# Patient Record
Sex: MALE | Race: WHITE | NOT HISPANIC OR LATINO | Employment: UNEMPLOYED | URBAN - METROPOLITAN AREA
[De-identification: names, ages, dates, MRNs, and addresses within clinical notes are randomized per-mention and may not be internally consistent; named-entity substitution may affect disease eponyms.]

---

## 2017-03-07 ENCOUNTER — HOSPITAL ENCOUNTER (EMERGENCY)
Facility: HOSPITAL | Age: 37
Discharge: OTHER INSTITUTION | End: 2017-03-07
Attending: EMERGENCY MEDICINE | Admitting: EMERGENCY MEDICINE

## 2017-03-07 VITALS
HEIGHT: 69 IN | TEMPERATURE: 99 F | BODY MASS INDEX: 26.51 KG/M2 | SYSTOLIC BLOOD PRESSURE: 139 MMHG | HEART RATE: 110 BPM | OXYGEN SATURATION: 96 % | WEIGHT: 179 LBS | RESPIRATION RATE: 16 BRPM | DIASTOLIC BLOOD PRESSURE: 88 MMHG

## 2017-03-07 DIAGNOSIS — F33.2 SEVERE EPISODE OF RECURRENT MAJOR DEPRESSIVE DISORDER, WITHOUT PSYCHOTIC FEATURES (HCC): ICD-10-CM

## 2017-03-07 DIAGNOSIS — T14.91XA SUICIDE ATTEMPT (HCC): Primary | ICD-10-CM

## 2017-03-07 LAB
ALBUMIN SERPL-MCNC: 4.8 G/DL (ref 3.5–5.2)
ALBUMIN/GLOB SERPL: 1.7 G/DL
ALP SERPL-CCNC: 78 U/L (ref 39–117)
ALT SERPL W P-5'-P-CCNC: 62 U/L (ref 1–41)
AMPHET+METHAMPHET UR QL: NEGATIVE
ANION GAP SERPL CALCULATED.3IONS-SCNC: 16.9 MMOL/L
APAP SERPL-MCNC: <5 MCG/ML (ref 10–30)
AST SERPL-CCNC: 27 U/L (ref 1–40)
BARBITURATES UR QL SCN: POSITIVE
BASOPHILS # BLD AUTO: 0.02 10*3/MM3 (ref 0–0.2)
BASOPHILS NFR BLD AUTO: 0.2 % (ref 0–1.5)
BENZODIAZ UR QL SCN: NEGATIVE
BILIRUB SERPL-MCNC: 0.3 MG/DL (ref 0.1–1.2)
BUN BLD-MCNC: 10 MG/DL (ref 6–20)
BUN/CREAT SERPL: 11.9 (ref 7–25)
CALCIUM SPEC-SCNC: 10 MG/DL (ref 8.6–10.5)
CANNABINOIDS SERPL QL: NEGATIVE
CHLORIDE SERPL-SCNC: 98 MMOL/L (ref 98–107)
CO2 SERPL-SCNC: 24.1 MMOL/L (ref 22–29)
COCAINE UR QL: NEGATIVE
CREAT BLD-MCNC: 0.84 MG/DL (ref 0.76–1.27)
DEPRECATED RDW RBC AUTO: 41.7 FL (ref 37–54)
EOSINOPHIL # BLD AUTO: 0.01 10*3/MM3 (ref 0–0.7)
EOSINOPHIL NFR BLD AUTO: 0.1 % (ref 0.3–6.2)
ERYTHROCYTE [DISTWIDTH] IN BLOOD BY AUTOMATED COUNT: 12.3 % (ref 11.5–14.5)
ETHANOL BLD-MCNC: <10 MG/DL (ref 0–10)
ETHANOL UR QL: <0.01 %
GFR SERPL CREATININE-BSD FRML MDRD: 103 ML/MIN/1.73
GLOBULIN UR ELPH-MCNC: 2.8 GM/DL
GLUCOSE BLD-MCNC: 124 MG/DL (ref 65–99)
HCT VFR BLD AUTO: 44.3 % (ref 40.4–52.2)
HGB BLD-MCNC: 15.6 G/DL (ref 13.7–17.6)
HOLD SPECIMEN: NORMAL
HOLD SPECIMEN: NORMAL
IMM GRANULOCYTES # BLD: 0.2 10*3/MM3 (ref 0–0.03)
IMM GRANULOCYTES NFR BLD: 1.6 % (ref 0–0.5)
LYMPHOCYTES # BLD AUTO: 1.27 10*3/MM3 (ref 0.9–4.8)
LYMPHOCYTES NFR BLD AUTO: 10.4 % (ref 19.6–45.3)
MCH RBC QN AUTO: 32.7 PG (ref 27–32.7)
MCHC RBC AUTO-ENTMCNC: 35.2 G/DL (ref 32.6–36.4)
MCV RBC AUTO: 92.9 FL (ref 79.8–96.2)
METHADONE UR QL SCN: NEGATIVE
MONOCYTES # BLD AUTO: 0.92 10*3/MM3 (ref 0.2–1.2)
MONOCYTES NFR BLD AUTO: 7.6 % (ref 5–12)
NEUTROPHILS # BLD AUTO: 9.75 10*3/MM3 (ref 1.9–8.1)
NEUTROPHILS NFR BLD AUTO: 80.1 % (ref 42.7–76)
OPIATES UR QL: NEGATIVE
OXYCODONE UR QL SCN: NEGATIVE
PLATELET # BLD AUTO: 169 10*3/MM3 (ref 140–500)
PMV BLD AUTO: 9.6 FL (ref 6–12)
POTASSIUM BLD-SCNC: 4.4 MMOL/L (ref 3.5–5.2)
PROT SERPL-MCNC: 7.6 G/DL (ref 6–8.5)
RBC # BLD AUTO: 4.77 10*6/MM3 (ref 4.6–6)
SALICYLATES SERPL-MCNC: <0.3 MG/DL
SODIUM BLD-SCNC: 139 MMOL/L (ref 136–145)
WBC NRBC COR # BLD: 12.17 10*3/MM3 (ref 4.5–10.7)
WHOLE BLOOD HOLD SPECIMEN: NORMAL
WHOLE BLOOD HOLD SPECIMEN: NORMAL

## 2017-03-07 PROCEDURE — 80307 DRUG TEST PRSMV CHEM ANLYZR: CPT | Performed by: PHYSICIAN ASSISTANT

## 2017-03-07 PROCEDURE — 99285 EMERGENCY DEPT VISIT HI MDM: CPT

## 2017-03-07 PROCEDURE — 90791 PSYCH DIAGNOSTIC EVALUATION: CPT

## 2017-03-07 PROCEDURE — 85025 COMPLETE CBC W/AUTO DIFF WBC: CPT | Performed by: PHYSICIAN ASSISTANT

## 2017-03-07 PROCEDURE — 80053 COMPREHEN METABOLIC PANEL: CPT | Performed by: PHYSICIAN ASSISTANT

## 2017-03-07 RX ORDER — CITALOPRAM 10 MG/1
10 TABLET ORAL DAILY
COMMUNITY
End: 2020-01-26 | Stop reason: HOSPADM

## 2017-03-07 RX ORDER — SODIUM CHLORIDE 0.9 % (FLUSH) 0.9 %
10 SYRINGE (ML) INJECTION AS NEEDED
Status: DISCONTINUED | OUTPATIENT
Start: 2017-03-07 | End: 2017-03-07 | Stop reason: HOSPADM

## 2017-03-07 NOTE — ED NOTES
"Pt. states, \"I keep trying to kill myself and it just won't work\".     Fabrizio De La Cruz RN  03/07/17 2935    "

## 2017-03-07 NOTE — ED NOTES
"Pt. states,\"  I feel like my skin is crawling and I can't stop feeling like I want to kill myself.\"     Fabrizio De La Cruz RN  03/07/17 8959    "

## 2017-03-07 NOTE — ED NOTES
Pt. attempted to leave premise I redirected patient and educated pt.about care after discharge from [facility].  Pt. agreed to stay until transfer to [facility].     Fabrizio De La Cruz RN  03/07/17 9075
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
## 2019-01-17 ENCOUNTER — TRANSCRIBE ORDERS (OUTPATIENT)
Dept: ADMINISTRATIVE | Facility: HOSPITAL | Age: 39
End: 2019-01-17

## 2019-01-17 ENCOUNTER — LAB (OUTPATIENT)
Dept: LAB | Facility: HOSPITAL | Age: 39
End: 2019-01-17
Attending: SPECIALIST

## 2019-01-17 ENCOUNTER — HOSPITAL ENCOUNTER (OUTPATIENT)
Dept: CARDIOLOGY | Facility: HOSPITAL | Age: 39
Discharge: HOME | End: 2019-01-17
Attending: SPECIALIST | Admitting: SPECIALIST

## 2019-01-17 DIAGNOSIS — Z01.818 PRE-OP EXAMINATION: ICD-10-CM

## 2019-01-17 DIAGNOSIS — Z01.818 PRE-OP EXAMINATION: Primary | ICD-10-CM

## 2019-01-17 LAB
ANION GAP SERPL CALCULATED.3IONS-SCNC: 13.7 MMOL/L
BASOPHILS # BLD AUTO: 0.04 10*3/MM3 (ref 0–0.2)
BASOPHILS NFR BLD AUTO: 0.5 % (ref 0–2)
BUN BLD-MCNC: 13 MG/DL (ref 6–20)
BUN/CREAT SERPL: 15.9 (ref 7–25)
CALCIUM SPEC-SCNC: 9.2 MG/DL (ref 8.6–10.5)
CHLORIDE SERPL-SCNC: 102 MMOL/L (ref 98–107)
CO2 SERPL-SCNC: 26.3 MMOL/L (ref 22–29)
CREAT BLD-MCNC: 0.82 MG/DL (ref 0.76–1.27)
DEPRECATED RDW RBC AUTO: 44.8 FL (ref 37–54)
EOSINOPHIL # BLD AUTO: 0.08 10*3/MM3 (ref 0.1–0.3)
EOSINOPHIL NFR BLD AUTO: 1.1 % (ref 0–4)
ERYTHROCYTE [DISTWIDTH] IN BLOOD BY AUTOMATED COUNT: 12.6 % (ref 11.5–14.5)
GFR SERPL CREATININE-BSD FRML MDRD: 105 ML/MIN/1.73
GLUCOSE BLD-MCNC: 94 MG/DL (ref 65–99)
HCT VFR BLD AUTO: 49.1 % (ref 42–52)
HGB BLD-MCNC: 16.3 G/DL (ref 14–18)
IMM GRANULOCYTES # BLD AUTO: 0.24 10*3/MM3 (ref 0–0.03)
IMM GRANULOCYTES NFR BLD AUTO: 3.2 % (ref 0–0.5)
LYMPHOCYTES # BLD AUTO: 1.48 10*3/MM3 (ref 0.6–4.8)
LYMPHOCYTES NFR BLD AUTO: 19.9 % (ref 20–45)
MCH RBC QN AUTO: 32.1 PG (ref 27–31)
MCHC RBC AUTO-ENTMCNC: 33.2 G/DL (ref 31–37)
MCV RBC AUTO: 96.8 FL (ref 80–94)
MONOCYTES # BLD AUTO: 0.7 10*3/MM3 (ref 0–1)
MONOCYTES NFR BLD AUTO: 9.4 % (ref 3–8)
NEUTROPHILS # BLD AUTO: 4.91 10*3/MM3 (ref 1.5–8.3)
NEUTROPHILS NFR BLD AUTO: 65.9 % (ref 45–70)
NRBC BLD AUTO-RTO: 0 /100 WBC (ref 0–0)
PLATELET # BLD AUTO: 194 10*3/MM3 (ref 140–500)
PMV BLD AUTO: 9 FL (ref 7.4–10.4)
POTASSIUM BLD-SCNC: 4.1 MMOL/L (ref 3.5–5.2)
RBC # BLD AUTO: 5.07 10*6/MM3 (ref 4.7–6.1)
SODIUM BLD-SCNC: 142 MMOL/L (ref 136–145)
WBC NRBC COR # BLD: 7.45 10*3/MM3 (ref 4.8–10.8)

## 2019-01-17 PROCEDURE — 93005 ELECTROCARDIOGRAM TRACING: CPT | Performed by: SPECIALIST

## 2019-01-17 PROCEDURE — 36415 COLL VENOUS BLD VENIPUNCTURE: CPT

## 2019-01-17 PROCEDURE — 93010 ELECTROCARDIOGRAM REPORT: CPT | Performed by: INTERNAL MEDICINE

## 2019-01-17 PROCEDURE — 80048 BASIC METABOLIC PNL TOTAL CA: CPT

## 2019-01-17 PROCEDURE — 85025 COMPLETE CBC W/AUTO DIFF WBC: CPT

## 2019-12-04 ENCOUNTER — HOSPITAL ENCOUNTER (OUTPATIENT)
Dept: PHYSICAL THERAPY | Facility: HOSPITAL | Age: 39
Setting detail: THERAPIES SERIES
Discharge: HOME | End: 2019-12-04

## 2019-12-04 DIAGNOSIS — Z98.890 STATUS POST LABRAL REPAIR OF SHOULDER: Primary | ICD-10-CM

## 2019-12-04 PROCEDURE — 97161 PT EVAL LOW COMPLEX 20 MIN: CPT

## 2019-12-04 NOTE — THERAPY EVALUATION
Outpatient Physical Therapy Ortho Initial Evaluation   Krysta Borjas     Patient Name: Abelino Austin  : 1980  MRN: [id number]  Today's Date: 2019      Visit Date: 2019    There is no problem list on file for this patient.       Past Medical History:   Diagnosis Date   • Anxiety    • Depression    • Substance abuse    • Suicidal intent    • Suicide attempt         No past surgical history on file.    Visit Dx:     ICD-10-CM ICD-9-CM   1. Status post labral repair of shoulder Z98.890 V45.89         Patient History     Row Name 19 1100             History    Chief Complaint  Difficulty with daily activities;Joint stiffness;Muscle tenderness;Muscle weakness;Pain  -AS      Type of Pain  Shoulder pain Left  -AS      Date Current Problem(s) Began  19  -AS      Brief Description of Current Complaint  Patient states he initially tore his labral and had it repaired in 2019. Patient states he went through PT and did well. He states he reinjured in shoulder when he fell at work. He recently had another surgical repair on 19. He states he wore a sling until this past Monday. He states he has been performing pendulum exercises. 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  -AS      Patient/Caregiver Goals  Relieve pain;Return to prior level of function;Return to work;Improve mobility;Improve strength  -AS      Patient's Rating of General Health  Good  -AS      Hand Dominance  left-handed  -AS      Occupation/sports/leisure activities  [employer]  -AS      Patient seeing anyone else for problem(s)?  Dr. Martínez  -AS      How has patient tried to help current problem?  rest, ice, sling  -AS      What clinical tests have you had for this problem?  MRI  -AS      Results of Clinical Tests  torn labram, L shoulder  -AS      Surgery/Hospitalization  19  -AS         Pain     Pain Location  Shoulder  -AS      Pain at Present  4   -AS      Pain at Best  0  -AS      Pain at Worst  8  -AS      Pain Frequency  Intermittent  -AS      Pain Description  Aching  -AS      What Performance Factors Make the Current Problem(s) WORSE?  use of LUE  -AS      What Performance Factors Make the Current Problem(s) BETTER?  rest  -AS         Daily Activities    Primary Language  English  -AS      How does patient learn best?  Listening;Reading  -AS      Teaching needs identified  Home Exercise Program;Management of Condition  -AS      Patient is concerned about/has problems with  Difficulty with self care (i.e. bathing, dressing, toileting:;Flexibility;Performing home management (household chores, shopping, care of dependents);Performing job responsibilities/community activities (work, school,;Performing sports, recreation, and play activities;Reaching over head;Repetitive movements of the hand, arm, shoulder  -AS      Does patient have problems with the following?  None  -AS      Barriers to learning  None  -AS      Pt Participated in POC and Goals  Yes  -AS         Safety    Are you being hurt, hit, or frightened by anyone at home or in your life?  No  -AS      Are you being neglected by a caregiver  No  -AS        User Key  (r) = Recorded By, (t) = Taken By, (c) = Cosigned By    Initials Name Provider Type    AS Alcides Carvalho, PT Physical Therapist          PT Ortho     Row Name 12/04/19 1100       Precautions and Contraindications    Precautions/Limitations  shoulder precautions Post-op labral repair 11-18-19  -AS       Subjective Pain    Able to rate subjective pain?  yes  -AS    Pre-Treatment Pain Level  4  -AS    Post-Treatment Pain Level  3  -AS       Posture/Observations    Posture- WNL  Posture is WNL  -AS    Observations  Incision healing  -AS       Shoulder Girdle Palpation    Subacromial Space  Left:;Tender  -AS    Long Head of Biceps  Left:;Tender  -AS    Greater Tubercule  Left:;Tender  -AS       Left Upper Ext    Lt Shoulder Abduction  PROM  124  -AS    Lt Shoulder Flexion PROM  125  -AS    Lt Shoulder External Rotation PROM  20  -AS    Lt Shoulder Internal Rotation PROM  70  -AS       MMT Left Upper Ext    Lt Shoulder Flexion MMT, Gross Movement  -- Not performed at IE due to recent surgery  -AS    Lt Shoulder ABduction MMT, Gross Movement  -- Not performed at IE due to recent surgery  -AS    Lt Shoulder Internal Rotation MMT, Gross Movement  -- Not performed at IE due to recent surgery  -AS    Lt Shoulder External Rotation MMT, Gross Movement  -- Not performed at IE due to recent surgery  -AS       Sensation    Sensation WNL?  WNL  -AS    Light Touch  No apparent deficits  -AS      User Key  (r) = Recorded By, (t) = Taken By, (c) = Cosigned By    Initials Name Provider Type    AS Alcides Carvalho, PT Physical Therapist                      Therapy Education  Given: HEP, Symptoms/condition management, Pain management  Program: New  How Provided: Verbal, Demonstration, Written  Provided to: Patient  Level of Understanding: Teach back education performed, Demonstrated, Verbalized     PT OP Goals     Row Name 12/04/19 1100          PT Short Term Goals    STG Date to Achieve  12/25/19  -AS     STG 1  Patient to demonstrate compliance with initial HEP for flexibility, ROM, and strengthening.  -AS     STG 2  Patient to report left shoulder pain on VAS of 4-5/10 at worst with activity.   -AS     STG 3  Patient to demonstrate improved left shoulder PROM to WNL in all planes.  -AS     STG 4  Patient to demonstrate improved left shoulder strength to 4-/5 in all planes.  -AS        Long Term Goals    LTG Date to Achieve  01/15/20  -AS     LTG 1  Patient to demonstrate compliance with advanced HEP for flexibility, ROM, and strengthening.  -AS     LTG 2  Patient to report left shoulder pain on VAS of 0-1/10 at worst with activity.   -AS     LTG 3  Patient to demonstrate improved left shoulder AROM to WNL in all planes.  -AS     LTG 4  Patient to  demonstrate improved left shoulder strength to 4/5 in all planes.  -AS     LTG 5  Patient to report improved function and decreased left shoulder pain on Quick DASH by >20-30 points.  -AS        Time Calculation    PT Goal Re-Cert Due Date  01/01/20  -AS       User Key  (r) = Recorded By, (t) = Taken By, (c) = Cosigned By    Initials Name Provider Type    AS Alcides Carvalho, PT Physical Therapist          PT Assessment/Plan     Row Name 12/04/19 1100          PT Assessment    Functional Limitations  Limitation in home management;Limitations in community activities;Performance in leisure activities;Performance in self-care ADL;Performance in sport activities;Performance in work activities  -AS     Impairments  Impaired flexibility;Joint mobility;Muscle strength;Pain;Range of motion  -AS     Assessment Comments  Patient is s/p left shoulder labral repair on 11-18-19. Patient recently D/C from sling and is now referred to outpatient PT. Patient has limited left shoulder ROM, limited left shoulder strength, and increased left shoulder pain and discomfort with activity. Patient is currently off work due to recent surgery. Patient has limited function at this time secondary to the above.  -AS     Please refer to paper survey for additional self-reported information  Yes  -AS     Rehab Potential  Good  -AS     Patient/caregiver participated in establishment of treatment plan and goals  Yes  -AS     Patient would benefit from skilled therapy intervention  Yes  -AS        PT Plan    PT Frequency  3x/week  -AS     Predicted Duration of Therapy Intervention (Therapy Eval)  5-6 weeks  -AS     Planned CPT's?  PT RE-EVAL: 43641;PT THER PROC EA 15 MIN: 21791;PT THER ACT EA 15 MIN: 45841;PT MANUAL THERAPY EA 15 MIN: 66625;PT NEUROMUSC RE-EDUCATION EA 15 MIN: 14859;PT ELECTRICAL STIM UNATTEND: ;PT ULTRASOUND EA 15 MIN: 30405;PT HOT/COLD PACK WC NONMCARE: 19673  -AS       User Key  (r) = Recorded By, (t) = Taken By, (c) =  Cosigned By    Initials Name Provider Type    AS Alcides Carvalho, PT Physical Therapist          Modalities     Row Name 12/04/19 1100             Ice    Ice Applied  Yes  -AS      Location  Left Shoulder  -AS      Rx Minutes  10 mins  -AS      Ice S/P Rx  Yes  -AS        User Key  (r) = Recorded By, (t) = Taken By, (c) = Cosigned By    Initials Name Provider Type    AS Alcides Carvalho, PT Physical Therapist        OP Exercises     Row Name 12/04/19 1100             Subjective Pain    Able to rate subjective pain?  yes  -AS      Pre-Treatment Pain Level  4  -AS      Post-Treatment Pain Level  3  -AS        User Key  (r) = Recorded By, (t) = Taken By, (c) = Cosigned By    Initials Name Provider Type    AS Alcides Carvalho, PT Physical Therapist           Manual Rx (last 36 hours)      Manual Treatments     Row Name 12/04/19 1100             Manual Rx 1    Manual Rx 1 Location  Left Shoulder  -AS      Manual Rx 1 Type  PROM - Flex, ABD, IR, ER  -AS      Manual Rx 1 Duration  15 min  -AS        User Key  (r) = Recorded By, (t) = Taken By, (c) = Cosigned By    Initials Name Provider Type    AS Alcides Carvalho, PT Physical Therapist                      Outcome Measure Options: Quick DASH  Quick DASH  Open a tight or new jar.: Severe Difficulty  Do heavy household chores (e.g., wash walls, wash floors): Unable  Carry a shopping bag or briefcase: Moderate Difficulty  Wash your back: Severe Difficulty  Use a knife to cut food: Moderate Difficulty  Recreational activities in which you take some force or impact through your arm, should or hand (e.g. golf, hammering, tennis, etc.): Unable  During the past week, to what extent has your arm, shoulder, or hand problem interfered with your normal social activites with family, friends, neighbors or groups?: Quite a bit  During the past week, were you limited in your work or other regular daily activities as a result of your arm, shoulder or hand problem?:  Unable  Arm, Shoulder, or hand pain: Severe  Tingling (pins and needles) in your arm, shoulder, or hand: None  During the past week, how much difficulty have you had sleeping because of the pain in your arm, shoulder or hand?: Severe Difficulty  Number of Questions Answered: 11  Quick DASH Score: 70.45  Work Module (Optional)  Using your usual technique for your work?: Unable  Doing your usual work because of arm, shoulder or hand pain?: Unable  Doing your work as well as you would like?: Unable  Spending your usual amount of time doing your work?: Unable  Work Module Score: 100  Sports/Performing Arts Module (Optional)  Using your usual technique for playing your instrument or sport?: Unable  Playing your musical instrument or sport because of arm, shoulder or hand pain?: Unable  Playing your musical instrument or sport as well as you would like?: Unable  Spending your usual amount of time practising or playing your instrument or sport?: Unable  Sports/Performing Arts Score: 100         Time Calculation:     Start Time: 1040  Stop Time: 1121  Time Calculation (min): 41 min     Therapy Charges for Today     Code Description Service Date Service Provider Modifiers Qty    [id number] HC PT EVAL LOW COMPLEXITY 2 12/4/2019 Alcides Carvalho, PT GP 1          PT G-Codes  Outcome Measure Options: Quick DASH  Quick DASH Score: 70.45         Alcides Carvalho, PT  12/4/2019

## 2019-12-06 ENCOUNTER — HOSPITAL ENCOUNTER (OUTPATIENT)
Dept: PHYSICAL THERAPY | Facility: HOSPITAL | Age: 39
Setting detail: THERAPIES SERIES
Discharge: HOME | End: 2019-12-06

## 2019-12-06 DIAGNOSIS — Z98.890 STATUS POST LABRAL REPAIR OF SHOULDER: Primary | ICD-10-CM

## 2019-12-06 PROCEDURE — 97140 MANUAL THERAPY 1/> REGIONS: CPT

## 2019-12-06 NOTE — THERAPY TREATMENT NOTE
Outpatient Physical Therapy Ortho Treatment Note  [facility]     Patient Name: Abelino Austin  : 1980  MRN: [id number]  Today's Date: 2019      Visit Date: 2019    Visit Dx:    ICD-10-CM ICD-9-CM   1. Status post labral repair of shoulder Z98.890 V45.89       There is no problem list on file for this patient.       Past Medical History:   Diagnosis Date   • Anxiety    • Depression    • Substance abuse    • Suicidal intent    • Suicide attempt         No past surgical history on file.    PT Ortho     Row Name 19 1100       Precautions and Contraindications    Precautions/Limitations  shoulder precautions Post-op labral repair 19  -AS       Subjective Pain    Able to rate subjective pain?  yes  -AS    Pre-Treatment Pain Level  4  -AS    Post-Treatment Pain Level  3  -AS       Posture/Observations    Posture- WNL  Posture is WNL  -AS    Observations  Incision healing  -AS       Shoulder Girdle Palpation    Subacromial Space  Left:;Tender  -AS    Long Head of Biceps  Left:;Tender  -AS    Greater Tubercule  Left:;Tender  -AS       Left Upper Ext    Lt Shoulder Abduction PROM  124  -AS    Lt Shoulder Flexion PROM  125  -AS    Lt Shoulder External Rotation PROM  20  -AS    Lt Shoulder Internal Rotation PROM  70  -AS       MMT Left Upper Ext    Lt Shoulder Flexion MMT, Gross Movement  -- Not performed at IE due to recent surgery  -AS    Lt Shoulder ABduction MMT, Gross Movement  -- Not performed at IE due to recent surgery  -AS    Lt Shoulder Internal Rotation MMT, Gross Movement  -- Not performed at IE due to recent surgery  -AS    Lt Shoulder External Rotation MMT, Gross Movement  -- Not performed at IE due to recent surgery  -AS       Sensation    Sensation WNL?  WNL  -AS    Light Touch  No apparent deficits  -AS      User Key  (r) = Recorded By, (t) = Taken By, (c) = Cosigned By    Initials Name Provider Type    AS Alcides Carvalho, PT Physical Therapist                       PT Assessment/Plan     Row Name 12/06/19 1100          PT Assessment    Assessment Comments  Continued with PROM of his left shoulder today as well as initiated pulleys today in flexion and abduction. Patient tolerated his treatment well today.  -AS        PT Plan    PT Plan Comments  Continue with current treatment plan.  -AS       User Key  (r) = Recorded By, (t) = Taken By, (c) = Cosigned By    Initials Name Provider Type    AS Alcides Carvalho, PT Physical Therapist          Modalities     Row Name 12/06/19 1100             Ice    Ice Applied  Yes  -AS      Location  Left Shoulder  -AS      Rx Minutes  10 mins  -AS      Ice S/P Rx  Yes  -AS        User Key  (r) = Recorded By, (t) = Taken By, (c) = Cosigned By    Initials Name Provider Type    AS Alcides Carvalho, PT Physical Therapist        OP Exercises     Row Name 12/06/19 1100             Subjective Comments    Subjective Comments  Patient states his shoulder is sore but he is doing ok this morning.  -AS         Exercise 1    Exercise Name 1  Pulleys - Flex & ABD  -AS      Time 1  3 min each  -AS        User Key  (r) = Recorded By, (t) = Taken By, (c) = Cosigned By    Initials Name Provider Type    AS Alcides Carvalho, PT Physical Therapist                      Manual Rx (last 36 hours)      Manual Treatments     Row Name 12/06/19 1100             Manual Rx 1    Manual Rx 1 Location  Left Shoulder  -AS      Manual Rx 1 Type  PROM - Flex, ABD, IR, ER  -AS      Manual Rx 1 Duration  15 min  -AS        User Key  (r) = Recorded By, (t) = Taken By, (c) = Cosigned By    Initials Name Provider Type    AS Alcides Carvalho, PT Physical Therapist                             Time Calculation:   Start Time: 1050  Stop Time: 1125  Time Calculation (min): 35 min  Therapy Charges for Today     Code Description Service Date Service Provider Modifiers Qty    55472775510 HC PT MANUAL THERAPY EA 15 MIN 12/6/2019 Alcides Carvalho, PT  GP 1                    Alcides Carvalho, PT  12/6/2019

## 2019-12-09 ENCOUNTER — HOSPITAL ENCOUNTER (OUTPATIENT)
Dept: PHYSICAL THERAPY | Facility: HOSPITAL | Age: 39
Setting detail: THERAPIES SERIES
Discharge: HOME | End: 2019-12-09

## 2019-12-09 DIAGNOSIS — Z98.890 STATUS POST LABRAL REPAIR OF SHOULDER: Primary | ICD-10-CM

## 2019-12-09 PROCEDURE — 97140 MANUAL THERAPY 1/> REGIONS: CPT

## 2019-12-09 NOTE — THERAPY TREATMENT NOTE
Outpatient Physical Therapy Ortho Treatment Note   Krysta Borjas     Patient Name: Abelino Austin  : 1980  MRN: [id number]  Today's Date: 2019      Visit Date: 2019    Visit Dx:    ICD-10-CM ICD-9-CM   1. Status post labral repair of shoulder Z98.890 V45.89       There is no problem list on file for this patient.       Past Medical History:   Diagnosis Date   • Anxiety    • Depression    • Substance abuse    • Suicidal intent    • Suicide attempt         No past surgical history on file.                    PT Assessment/Plan     Row Name 19 1100          PT Assessment    Assessment Comments  Patient's left shoulder ROM is improving. He tolerated his treatment well today. Patient continues to complain of left shoulder pain and discomfort.  -AS        PT Plan    PT Plan Comments  Continue with current treatment plan.  -AS       User Key  (r) = Recorded By, (t) = Taken By, (c) = Cosigned By    Initials Name Provider Type    AS Alcides Carvalho, PT Physical Therapist          Modalities     Row Name 19 1100             Moist Heat    MH Applied  Yes  -AS      Location  Left Shoulder  -AS      Rx Minutes  10 mins  -AS      MH Prior to Rx  Yes  -AS         Ice    Ice Applied  Yes  -AS      Location  Left Shoulder  -AS      Rx Minutes  10 mins  -AS      Ice S/P Rx  Yes  -AS        User Key  (r) = Recorded By, (t) = Taken By, (c) = Cosigned By    Initials Name Provider Type    AS Alcides Carvalho, PT Physical Therapist        OP Exercises     Row Name 19 1100             Subjective Comments    Subjective Comments  Patient states his shoulder feels sore today.  -AS         Exercise 1    Exercise Name 1  Pulleys - Flex & ABD  -AS      Time 1  4 min each  -AS        User Key  (r) = Recorded By, (t) = Taken By, (c) = Cosigned By    Initials Name Provider Type    AS Alcides Carvahlo, PT Physical Therapist                      Manual Rx (last 36 hours)      Manual  Treatments     Row Name 12/09/19 1100             Manual Rx 1    Manual Rx 1 Location  Left Shoulder  -AS      Manual Rx 1 Type  PROM - Flex, ABD, IR, ER  -AS      Manual Rx 1 Duration  15 min  -AS        User Key  (r) = Recorded By, (t) = Taken By, (c) = Cosigned By    Initials Name Provider Type    AS Alcides Carvalho, PT Physical Therapist                             Time Calculation:   Start Time: 1054  Stop Time: 1132  Time Calculation (min): 38 min  Therapy Charges for Today     Code Description Service Date Service Provider Modifiers Qty    50607920531  PT MANUAL THERAPY EA 15 MIN 12/9/2019 Alcides Carvalho, PT GP 1                    Alcides Carvalho, PT  12/9/2019

## 2019-12-13 ENCOUNTER — HOSPITAL ENCOUNTER (OUTPATIENT)
Dept: PHYSICAL THERAPY | Facility: HOSPITAL | Age: 39
Setting detail: THERAPIES SERIES
Discharge: HOME | End: 2019-12-13

## 2019-12-13 DIAGNOSIS — Z98.890 STATUS POST LABRAL REPAIR OF SHOULDER: Primary | ICD-10-CM

## 2019-12-13 PROCEDURE — 97140 MANUAL THERAPY 1/> REGIONS: CPT

## 2019-12-13 NOTE — THERAPY TREATMENT NOTE
Outpatient Physical Therapy Ortho Treatment Note   Krysta Borjas     Patient Name: Abelino Austin  : 1980  MRN: [id number]  Today's Date: 2019      Visit Date: 2019    Visit Dx:    ICD-10-CM ICD-9-CM   1. Status post labral repair of shoulder Z98.890 V45.89       There is no problem list on file for this patient.       Past Medical History:   Diagnosis Date   • Anxiety    • Depression    • Substance abuse    • Suicidal intent    • Suicide attempt         No past surgical history on file.                    PT Assessment/Plan     Row Name 19 1100          PT Assessment    Assessment Comments  Patient continues to demonstrate improved left shoulder ROM. Plan to continue to progress patient as tolerated.  -AS        PT Plan    PT Plan Comments  Continue with current treatment plan.  -AS       User Key  (r) = Recorded By, (t) = Taken By, (c) = Cosigned By    Initials Name Provider Type    AS Alcides Carvalho, PT Physical Therapist          Modalities     Row Name 19 1100             Moist Heat    MH Applied  Yes  -AS      Location  Left Shoulder  -AS      Rx Minutes  10 mins  -AS      MH Prior to Rx  Yes  -AS         Ice    Ice Applied  Yes  -AS      Location  Left Shoulder  -AS      Rx Minutes  10 mins  -AS      Ice S/P Rx  Yes  -AS        User Key  (r) = Recorded By, (t) = Taken By, (c) = Cosigned By    Initials Name Provider Type    AS Alcides Carvalho, PT Physical Therapist        OP Exercises     Row Name 19 1100             Subjective Comments    Subjective Comments  Patient states his shoulder is doing well. He states he can tell he has more ROM than he had before.  -AS         Exercise 1    Exercise Name 1  Pulleys - Flex & ABD  -AS      Time 1  4 min each  -AS        User Key  (r) = Recorded By, (t) = Taken By, (c) = Cosigned By    Initials Name Provider Type    AS Alcides Carvalho, PT Physical Therapist                      Manual Rx (last 36 hours)       Manual Treatments     Row Name 12/13/19 1100             Manual Rx 1    Manual Rx 1 Location  Left Shoulder  -AS      Manual Rx 1 Type  PROM - Flex, ABD, IR, ER  -AS      Manual Rx 1 Duration  15 min  -AS        User Key  (r) = Recorded By, (t) = Taken By, (c) = Cosigned By    Initials Name Provider Type    AS Alcides Carvalho, PT Physical Therapist                             Time Calculation:   Start Time: 1100  Stop Time: 1145  Time Calculation (min): 45 min  Therapy Charges for Today     Code Description Service Date Service Provider Modifiers Qty    05223142739  PT MANUAL THERAPY EA 15 MIN 12/13/2019 Alcides Carvalho, PT GP 1                    Alcides Carvalho, PT  12/13/2019

## 2019-12-16 ENCOUNTER — APPOINTMENT (OUTPATIENT)
Dept: PHYSICAL THERAPY | Facility: HOSPITAL | Age: 39
End: 2019-12-16

## 2019-12-18 ENCOUNTER — HOSPITAL ENCOUNTER (OUTPATIENT)
Dept: PHYSICAL THERAPY | Facility: HOSPITAL | Age: 39
Setting detail: THERAPIES SERIES
Discharge: HOME | End: 2019-12-18

## 2019-12-18 DIAGNOSIS — Z98.890 STATUS POST LABRAL REPAIR OF SHOULDER: Primary | ICD-10-CM

## 2019-12-18 PROCEDURE — 97110 THERAPEUTIC EXERCISES: CPT

## 2019-12-18 PROCEDURE — 97140 MANUAL THERAPY 1/> REGIONS: CPT

## 2019-12-18 NOTE — THERAPY TREATMENT NOTE
Outpatient Physical Therapy Ortho Treatment Note   Krysta Borjas     Patient Name: Abelino Austin  : 1980  MRN: [id number]  Today's Date: 2019      Visit Date: 2019    Visit Dx:    ICD-10-CM ICD-9-CM   1. Status post labral repair of shoulder Z98.890 V45.89       There is no problem list on file for this patient.       Past Medical History:   Diagnosis Date   • Anxiety    • Depression    • Substance abuse    • Suicidal intent    • Suicide attempt         No past surgical history on file.                    PT Assessment/Plan     Row Name 19 1100          PT Assessment    Assessment Comments  Progressed patient with ROM exercises and initiated a HEP today. He tolerated progressions made in his exercises well today and without complaints of increased left shoulder pain or discomfort.   -AS        PT Plan    PT Plan Comments  Continue with current treatment plan.  -AS       User Key  (r) = Recorded By, (t) = Taken By, (c) = Cosigned By    Initials Name Provider Type    AS Alcides Carvalho, PT Physical Therapist          Modalities     Row Name 19 1100             Moist Heat    MH Applied  Yes  -AS      Location  Left Shoulder  -AS      Rx Minutes  10 mins  -AS      MH Prior to Rx  Yes  -AS         Ice    Ice Applied  --  -AS      Location  --  -AS      Rx Minutes  --  -AS      Ice S/P Rx  --  -AS        User Key  (r) = Recorded By, (t) = Taken By, (c) = Cosigned By    Initials Name Provider Type    AS Alcides Carvalho, PT Physical Therapist        OP Exercises     Row Name 19 1100             Subjective Comments    Subjective Comments  Patient states his shoulder is doing well this morning.  -AS         Exercise 1    Exercise Name 1  3-Way Cane  -AS      Reps 1  25 each  -AS         Exercise 2    Exercise Name 2  Pulleys - Flex & ABD  -AS      Time 2  5 min each  -AS        User Key  (r) = Recorded By, (t) = Taken By, (c) = Cosigned By    Initials Name Provider  Type    AS Alcides Carvalho, PT Physical Therapist                      Manual Rx (last 36 hours)      Manual Treatments     Row Name 12/18/19 1100             Manual Rx 1    Manual Rx 1 Location  Left Shoulder  -AS      Manual Rx 1 Type  PROM - Flex, ABD, IR, ER  -AS      Manual Rx 1 Duration  15 min  -AS        User Key  (r) = Recorded By, (t) = Taken By, (c) = Cosigned By    Initials Name Provider Type    AS Alcides Carvalho, PT Physical Therapist                             Time Calculation:   Start Time: 1057  Stop Time: 1138  Time Calculation (min): 41 min  Therapy Charges for Today     Code Description Service Date Service Provider Modifiers Qty    52479594405  PT THER PROC EA 15 MIN 12/18/2019 Alcides Carvalho, PT GP 1    [id number]  PT MANUAL THERAPY EA 15 MIN 12/18/2019 Alcides Carvalho, PT GP 1                    Alcides Carvalho, PT  12/18/2019

## 2019-12-20 ENCOUNTER — HOSPITAL ENCOUNTER (OUTPATIENT)
Dept: PHYSICAL THERAPY | Facility: HOSPITAL | Age: 39
Setting detail: THERAPIES SERIES
Discharge: HOME | End: 2019-12-20

## 2019-12-20 DIAGNOSIS — Z98.890 STATUS POST LABRAL REPAIR OF SHOULDER: Primary | ICD-10-CM

## 2019-12-20 PROCEDURE — 97110 THERAPEUTIC EXERCISES: CPT

## 2019-12-20 PROCEDURE — 97140 MANUAL THERAPY 1/> REGIONS: CPT

## 2019-12-20 NOTE — THERAPY TREATMENT NOTE
Outpatient Physical Therapy Ortho Treatment Note   Krysta Borjas     Patient Name: Abelino Austin  : 1980  MRN: [id number]  Today's Date: 2019      Visit Date: 2019    Visit Dx:    ICD-10-CM ICD-9-CM   1. Status post labral repair of shoulder Z98.890 V45.89       There is no problem list on file for this patient.       Past Medical History:   Diagnosis Date   • Anxiety    • Depression    • Substance abuse    • Suicidal intent    • Suicide attempt         No past surgical history on file.                    PT Assessment/Plan     Row Name 19 1100          PT Assessment    Assessment Comments  Patient continues to do well and demonstrate improved tolerance to manual therapy PROM.   -AS        PT Plan    PT Plan Comments  Continue with current treatment plan.  -AS       User Key  (r) = Recorded By, (t) = Taken By, (c) = Cosigned By    Initials Name Provider Type    AS Alcides Carvalho, PT Physical Therapist          Modalities     Row Name 19 1100             Moist Heat    MH Applied  Yes  -AS      Location  Left Shoulder  -AS      Rx Minutes  10 mins  -AS      MH Prior to Rx  Yes  -AS        User Key  (r) = Recorded By, (t) = Taken By, (c) = Cosigned By    Initials Name Provider Type    AS Alcides Carvalho, PT Physical Therapist        OP Exercises     Row Name 19 1100             Subjective Comments    Subjective Comments  Patient states he was a little sore after his last treatment session but si doing well this morning.  -AS         Exercise 1    Exercise Name 1  3-Way Cane  -AS      Reps 1  25 each  -AS         Exercise 2    Exercise Name 2  Pulleys - Flex & ABD  -AS      Time 2  5 min each  -AS        User Key  (r) = Recorded By, (t) = Taken By, (c) = Cosigned By    Initials Name Provider Type    AS Alcides Carvalho, PT Physical Therapist                      Manual Rx (last 36 hours)      Manual Treatments     Row Name 19 1100             Manual  Rx 1    Manual Rx 1 Location  Left Shoulder  -AS      Manual Rx 1 Type  PROM - Flex, ABD, IR, ER  -AS      Manual Rx 1 Duration  15 min  -AS        User Key  (r) = Recorded By, (t) = Taken By, (c) = Cosigned By    Initials Name Provider Type    AS Alcides Carvalho, PT Physical Therapist                             Time Calculation:   Start Time: 1102  Stop Time: 1148  Time Calculation (min): 46 min  Therapy Charges for Today     Code Description Service Date Service Provider Modifiers Qty    78393809921  PT THER PROC EA 15 MIN 12/20/2019 Alcides Carvalho, PT GP 1    [id number]  PT MANUAL THERAPY EA 15 MIN 12/20/2019 Alcides Carvalho, PT GP 1                    Alcides Carvalho, PT  12/20/2019

## 2019-12-23 ENCOUNTER — HOSPITAL ENCOUNTER (OUTPATIENT)
Dept: PHYSICAL THERAPY | Facility: HOSPITAL | Age: 39
Setting detail: THERAPIES SERIES
Discharge: HOME | End: 2019-12-23

## 2019-12-23 DIAGNOSIS — Z98.890 STATUS POST LABRAL REPAIR OF SHOULDER: Primary | ICD-10-CM

## 2019-12-23 PROCEDURE — 97140 MANUAL THERAPY 1/> REGIONS: CPT

## 2019-12-23 NOTE — THERAPY TREATMENT NOTE
Outpatient Physical Therapy Ortho Treatment Note   Krysta Borjas     Patient Name: Abelino Austin  : 1980  MRN: [id number]  Today's Date: 2019      Visit Date: 2019    Visit Dx:    ICD-10-CM ICD-9-CM   1. Status post labral repair of shoulder Z98.890 V45.89       There is no problem list on file for this patient.       Past Medical History:   Diagnosis Date   • Anxiety    • Depression    • Substance abuse    • Suicidal intent    • Suicide attempt         No past surgical history on file.                    PT Assessment/Plan     Row Name 19 1200          PT Assessment    Assessment Comments  Patient is progressing well toward his goals at this time.  -AS        PT Plan    PT Plan Comments  Continue with current treatment plan.  -AS       User Key  (r) = Recorded By, (t) = Taken By, (c) = Cosigned By    Initials Name Provider Type    AS Alcides Carvaloh, PT Physical Therapist          Modalities     Row Name 19 1200             Moist Heat    MH Applied  Yes  -AS      Location  Left Shoulder  -AS      Rx Minutes  10 mins  -AS      MH Prior to Rx  Yes  -AS        User Key  (r) = Recorded By, (t) = Taken By, (c) = Cosigned By    Initials Name Provider Type    AS Alcides Carvalho, PT Physical Therapist        OP Exercises     Row Name 19 1200             Subjective Comments    Subjective Comments  Patient states he fell down his basement steps but does not believe he injured his shoulder.  -AS         Exercise 1    Exercise Name 1  3-Way Cane  -AS      Reps 1  25 each  -AS         Exercise 2    Exercise Name 2  Pulleys - Flex & ABD  -AS      Time 2  5 min each  -AS        User Key  (r) = Recorded By, (t) = Taken By, (c) = Cosigned By    Initials Name Provider Type    AS Alcides Carvalho, PT Physical Therapist                      Manual Rx (last 36 hours)      Manual Treatments     Row Name 19 1100             Manual Rx 1    Manual Rx 1 Location  Left  Shoulder  -AS      Manual Rx 1 Type  PROM - Flex, ABD, IR, ER  -AS      Manual Rx 1 Duration  15 min  -AS        User Key  (r) = Recorded By, (t) = Taken By, (c) = Cosigned By    Initials Name Provider Type    AS Alcides Carvalho, PT Physical Therapist                             Time Calculation:   Start Time: 1100  Stop Time: 1137  Time Calculation (min): 37 min  Therapy Charges for Today     Code Description Service Date Service Provider Modifiers Qty    [id number]  PT MANUAL THERAPY EA 15 MIN 12/23/2019 Alcides Carvalho, PT GP 1                    Alcides Carvalho, PT  12/23/2019

## 2019-12-27 ENCOUNTER — HOSPITAL ENCOUNTER (OUTPATIENT)
Dept: PHYSICAL THERAPY | Facility: HOSPITAL | Age: 39
Setting detail: THERAPIES SERIES
Discharge: HOME | End: 2019-12-27

## 2019-12-27 DIAGNOSIS — Z98.890 STATUS POST LABRAL REPAIR OF SHOULDER: Primary | ICD-10-CM

## 2019-12-27 PROCEDURE — 97140 MANUAL THERAPY 1/> REGIONS: CPT

## 2019-12-27 NOTE — THERAPY TREATMENT NOTE
Outpatient Physical Therapy Ortho Treatment Note   Krysta Borjas     Patient Name: Abelino Austin  : 1980  MRN: [id number]  Today's Date: 2019      Visit Date: 2019    Visit Dx:    ICD-10-CM ICD-9-CM   1. Status post labral repair of shoulder Z98.890 V45.89       There is no problem list on file for this patient.       Past Medical History:   Diagnosis Date   • Anxiety    • Depression    • Substance abuse    • Suicidal intent    • Suicide attempt         No past surgical history on file.                    PT Assessment/Plan     Row Name 19 1100          PT Assessment    Assessment Comments  Patient's left shoulder PROM continues to improve as well as his tolerance to PROM. Plan to initiate strengthening at his next treatment session.  -AS        PT Plan    PT Plan Comments  Continue with current treatment plan.  -AS       User Key  (r) = Recorded By, (t) = Taken By, (c) = Cosigned By    Initials Name Provider Type    AS Alcides Carvalho, PT Physical Therapist          Modalities     Row Name 19 1100             Moist Heat    MH Applied  Yes  -AS      Location  Left Shoulder  -AS      Rx Minutes  10 mins  -AS      MH Prior to Rx  Yes  -AS        User Key  (r) = Recorded By, (t) = Taken By, (c) = Cosigned By    Initials Name Provider Type    AS Alcides Carvalho, PT Physical Therapist        OP Exercises     Row Name 19 1100             Subjective Comments    Subjective Comments  Patient states his shoulder continues to improve.  -AS         Exercise 1    Exercise Name 1  3-Way Cane  -AS      Reps 1  25 each  -AS         Exercise 2    Exercise Name 2  Pulleys - Flex & ABD  -AS      Time 2  5 min each  -AS        User Key  (r) = Recorded By, (t) = Taken By, (c) = Cosigned By    Initials Name Provider Type    AS Alcides Carvalho, PT Physical Therapist                      Manual Rx (last 36 hours)      Manual Treatments     Row Name 19 1100              Manual Rx 1    Manual Rx 1 Location  Left Shoulder  -AS      Manual Rx 1 Type  PROM - Flex, ABD, IR, ER  -AS      Manual Rx 1 Duration  15 min  -AS        User Key  (r) = Recorded By, (t) = Taken By, (c) = Cosigned By    Initials Name Provider Type    AS Alcides Carvalho, PT Physical Therapist                             Time Calculation:   Start Time: 1048  Stop Time: 1137  Time Calculation (min): 49 min  Therapy Charges for Today     Code Description Service Date Service Provider Modifiers Qty    82538807176  PT MANUAL THERAPY EA 15 MIN 12/27/2019 Alcides Carvalho, PT GP 1                    Alcides Carvalho, PT  12/27/2019

## 2019-12-30 ENCOUNTER — HOSPITAL ENCOUNTER (OUTPATIENT)
Dept: PHYSICAL THERAPY | Facility: HOSPITAL | Age: 39
Setting detail: THERAPIES SERIES
Discharge: HOME | End: 2019-12-30

## 2019-12-30 DIAGNOSIS — Z98.890 STATUS POST LABRAL REPAIR OF SHOULDER: Primary | ICD-10-CM

## 2019-12-30 PROCEDURE — 97140 MANUAL THERAPY 1/> REGIONS: CPT

## 2019-12-30 PROCEDURE — 97110 THERAPEUTIC EXERCISES: CPT

## 2019-12-30 NOTE — THERAPY TREATMENT NOTE
Outpatient Physical Therapy Ortho Treatment Note   Krysta Borjas     Patient Name: Abelino Austin  : 1980  MRN: [id number]  Today's Date: 2019      Visit Date: 2019    Visit Dx:    ICD-10-CM ICD-9-CM   1. Status post labral repair of shoulder Z98.890 V45.89       There is no problem list on file for this patient.       Past Medical History:   Diagnosis Date   • Anxiety    • Depression    • Substance abuse    • Suicidal intent    • Suicide attempt         No past surgical history on file.                    PT Assessment/Plan     Row Name 19 1100          PT Assessment    Assessment Comments  Initiated strengthening exercises today without complaints of increased left shoulder pain or discomfort.  -AS        PT Plan    PT Plan Comments  Continue with current treatment plan.  -AS       User Key  (r) = Recorded By, (t) = Taken By, (c) = Cosigned By    Initials Name Provider Type    AS Alcides Carvalho, PT Physical Therapist          Modalities     Row Name 19 1100             Moist Heat    MH Applied  Yes  -AS      Location  Left Shoulder  -AS      Rx Minutes  10 mins  -AS      MH Prior to Rx  Yes  -AS        User Key  (r) = Recorded By, (t) = Taken By, (c) = Cosigned By    Initials Name Provider Type    AS Alcides Carvalho, PT Physical Therapist        OP Exercises     Row Name 19 1100             Subjective Comments    Subjective Comments  Patient states his shoulder is doing well and he denies having any issues at this time.  -AS         Exercise 1    Exercise Name 1  3-Way Cane  -AS      Reps 1  25 each  -AS         Exercise 2    Exercise Name 2  Pulleys - Flex & ABD  -AS      Time 2  5 min each  -AS         Exercise 3    Exercise Name 3  S/L ER  -AS      Reps 3  25  -AS         Exercise 4    Exercise Name 4  Prone I, Y, T  -AS         Exercise 5    Exercise Name 5  Empty/Full Can  -AS         Exercise 6    Exercise Name 6  Rows  -AS      Reps 6  25  -AS       Additional Comments  Blue  -AS         Exercise 7    Exercise Name 7  Extensions  -AS      Reps 7  25  -AS      Additional Comments  Blue  -AS         Exercise 8    Exercise Name 8  IR  -AS         Exercise 9    Exercise Name 9  ER  -AS        User Key  (r) = Recorded By, (t) = Taken By, (c) = Cosigned By    Initials Name Provider Type    AS Alcides Carvalho, PT Physical Therapist                      Manual Rx (last 36 hours)      Manual Treatments     Row Name 12/30/19 1100             Manual Rx 1    Manual Rx 1 Location  Left Shoulder  -AS      Manual Rx 1 Type  PROM - Flex, ABD, IR, ER  -AS      Manual Rx 1 Duration  15 min  -AS        User Key  (r) = Recorded By, (t) = Taken By, (c) = Cosigned By    Initials Name Provider Type    AS Alcides Carvalho, PT Physical Therapist                             Time Calculation:   Start Time: 1136  Stop Time: 1222  Time Calculation (min): 46 min  Therapy Charges for Today     Code Description Service Date Service Provider Modifiers Qty    87208515250  PT THER PROC EA 15 MIN 12/30/2019 Alcides Carvalho, PT GP 1    [id number]  PT MANUAL THERAPY EA 15 MIN 12/30/2019 Alcides Carvalho, PT GP 1                    Alcides Carvalho, PT  12/30/2019

## 2020-01-03 ENCOUNTER — HOSPITAL ENCOUNTER (OUTPATIENT)
Dept: PHYSICAL THERAPY | Facility: HOSPITAL | Age: 40
Setting detail: THERAPIES SERIES
Discharge: HOME | End: 2020-01-03

## 2020-01-03 DIAGNOSIS — Z98.890 STATUS POST LABRAL REPAIR OF SHOULDER: Primary | ICD-10-CM

## 2020-01-03 PROCEDURE — 97110 THERAPEUTIC EXERCISES: CPT

## 2020-01-03 PROCEDURE — 97140 MANUAL THERAPY 1/> REGIONS: CPT

## 2020-01-03 NOTE — THERAPY TREATMENT NOTE
Outpatient Physical Therapy Ortho Treatment Note   Krysta Borjas     Patient Name: Abelino Austin  : 1980  MRN: [id number]  Today's Date: 1/3/2020      Visit Date: 2020    Visit Dx:    ICD-10-CM ICD-9-CM   1. Status post labral repair of shoulder Z98.890 V45.89       There is no problem list on file for this patient.       Past Medical History:   Diagnosis Date   • Anxiety    • Depression    • Substance abuse    • Suicidal intent    • Suicide attempt         No past surgical history on file.                    PT Assessment/Plan     Row Name 20 1200          PT Assessment    Assessment Comments  Patient contin ues to do well with PT at this time with improved left shoulder ROM, strength, function, and tolerance to his exercises.  -AS        PT Plan    PT Plan Comments  Continue with current treatment plan.  -AS       User Key  (r) = Recorded By, (t) = Taken By, (c) = Cosigned By    Initials Name Provider Type    AS Alcides Carvalho, PT Physical Therapist          Modalities     Row Name 20 1200             Moist Heat    MH Applied  Yes  -AS      Location  Left Shoulder  -AS      Rx Minutes  10 mins  -AS      MH Prior to Rx  Yes  -AS        User Key  (r) = Recorded By, (t) = Taken By, (c) = Cosigned By    Initials Name Provider Type    AS Alcides Carvalho, PT Physical Therapist        OP Exercises     Row Name 20 1200             Subjective Comments    Subjective Comments  Patient states his shoulder is doing well. He denies having issues at this time.  -AS         Exercise 1    Exercise Name 1  3-Way Cane  -AS      Reps 1  25 each  -AS         Exercise 2    Exercise Name 2  Pulleys - Flex & ABD  -AS      Time 2  5 min each  -AS         Exercise 3    Exercise Name 3  S/L ER  -AS      Reps 3  30  -AS         Exercise 4    Exercise Name 4  Prone I, Y, T  -AS         Exercise 5    Exercise Name 5  Empty/Full Can  -AS      Reps 5  25 each  -AS         Exercise 6     Exercise Name 6  Rows  -AS      Reps 6  25  -AS      Additional Comments  Blue  -AS         Exercise 7    Exercise Name 7  Extensions  -AS      Reps 7  25  -AS      Additional Comments  Blue  -AS         Exercise 8    Exercise Name 8  IR  -AS      Reps 8  25  -AS      Additional Comments  Blue  -AS         Exercise 9    Exercise Name 9  ER  -AS      Reps 9  25  -AS      Additional Comments  Blue  -AS        User Key  (r) = Recorded By, (t) = Taken By, (c) = Cosigned By    Initials Name Provider Type    AS Alcides Carvalho, PT Physical Therapist                      Manual Rx (last 36 hours)      Manual Treatments     Row Name 01/03/20 1300             Manual Rx 1    Manual Rx 1 Location  Left Shoulder  -AS      Manual Rx 1 Type  PROM - Flex, ABD, IR, ER  -AS      Manual Rx 1 Duration  15 min  -AS        User Key  (r) = Recorded By, (t) = Taken By, (c) = Cosigned By    Initials Name Provider Type    AS Alcides Carvalho, PT Physical Therapist                             Time Calculation:   Start Time: 1135  Stop Time: 1230  Time Calculation (min): 55 min  Therapy Charges for Today     Code Description Service Date Service Provider Modifiers Qty    40021758043  PT THER PROC EA 15 MIN 1/3/2020 Alcides Carvalho, PT GP 1    [id number]  PT MANUAL THERAPY EA 15 MIN 1/3/2020 Alcides Carvalho, PT GP 1                    Alcides Carvalho, PT  1/3/2020

## 2020-01-06 ENCOUNTER — HOSPITAL ENCOUNTER (OUTPATIENT)
Dept: PHYSICAL THERAPY | Facility: HOSPITAL | Age: 40
Setting detail: THERAPIES SERIES
Discharge: HOME | End: 2020-01-06

## 2020-01-06 DIAGNOSIS — Z98.890 STATUS POST LABRAL REPAIR OF SHOULDER: Primary | ICD-10-CM

## 2020-01-06 PROCEDURE — 97110 THERAPEUTIC EXERCISES: CPT

## 2020-01-06 PROCEDURE — 97140 MANUAL THERAPY 1/> REGIONS: CPT

## 2020-01-07 NOTE — THERAPY TREATMENT NOTE
Outpatient Physical Therapy Ortho Treatment Note   Krysta Borjas     Patient Name: Abelino Austin  : 1980  MRN: [id number]  Today's Date: 2020      Visit Date: 2020    Visit Dx:    ICD-10-CM ICD-9-CM   1. Status post labral repair of shoulder Z98.890 V45.89       There is no problem list on file for this patient.       Past Medical History:   Diagnosis Date   • Anxiety    • Depression    • Substance abuse    • Suicidal intent    • Suicide attempt         No past surgical history on file.                    PT Assessment/Plan     Row Name 20 1140          PT Assessment    Assessment Comments  Pt demonstrates increased ROM with passive stretching and demonstrates improved tolerance with ther ex with additional repetitions completed.   -KM        PT Plan    PT Plan Comments  Continue to progress as tolerated.   -KM       User Key  (r) = Recorded By, (t) = Taken By, (c) = Cosigned By    Initials Name Provider Type    Susan Vergara PTA Physical Therapy Assistant          Modalities     Row Name 20 1140             Moist Heat    MH Applied  Yes  -KM      Location  Left Shoulder  -KM      Rx Minutes  10 mins  -KM      MH Prior to Rx  Yes  -KM        User Key  (r) = Recorded By, (t) = Taken By, (c) = Cosigned By    Initials Name Provider Type    Susan Vergara PTA Physical Therapy Assistant        OP Exercises     Row Name 20 1140             Subjective Comments    Subjective Comments  Pt states his shoulder is doing well and feels like it continues to improve with overall function and daily activities  -KM         Exercise 1    Exercise Name 1  3-Way Cane  -KM      Reps 1  25 each  -KM         Exercise 2    Exercise Name 2  Pulleys - Flex & ABD  -KM      Time 2  5 min each  -KM         Exercise 3    Exercise Name 3  S/L ER  -KM      Reps 3  30  -KM         Exercise 4    Exercise Name 4  Prone I, Y, T  -KM         Exercise 5    Exercise Name 5  Empty/Full Can  -KM       Reps 5  30 each  -KM         Exercise 6    Exercise Name 6  Rows  -KM      Reps 6  30  -KM      Additional Comments  Blue  -KM         Exercise 7    Exercise Name 7  Extensions  -KM      Reps 7  30  -KM      Additional Comments  Blue  -KM         Exercise 8    Exercise Name 8  IR  -KM      Reps 8  30  -KM      Additional Comments  Blue  -KM         Exercise 9    Exercise Name 9  ER  -KM      Reps 9  30  -KM      Additional Comments  Blue  -KM        User Key  (r) = Recorded By, (t) = Taken By, (c) = Cosigned By    Initials Name Provider Type    Susan Vergara PTA Physical Therapy Assistant                      Manual Rx (last 36 hours)      Manual Treatments     Row Name 01/06/20 1140             Manual Rx 1    Manual Rx 1 Location  Left Shoulder  -KM      Manual Rx 1 Type  PROM - Flex, ABD, IR, ER  -KM      Manual Rx 1 Duration  15 min  -KM        User Key  (r) = Recorded By, (t) = Taken By, (c) = Cosigned By    Initials Name Provider Type    Susan Vergara PTA Physical Therapy Assistant                             Time Calculation:   Start Time: 1140  Stop Time: 1235  Time Calculation (min): 55 min  Therapy Charges for Today     Code Description Service Date Service Provider Modifiers Qty    65378505863 HC PT MANUAL THERAPY EA 15 MIN 1/6/2020 Susan Gonzalez PTA GP 1    13629353245 HC PT THER PROC EA 15 MIN 1/6/2020 Susan Gonzalez PTA GP 1                    Susan Gonzalez PTA  1/7/2020

## 2020-01-10 ENCOUNTER — HOSPITAL ENCOUNTER (OUTPATIENT)
Dept: PHYSICAL THERAPY | Facility: HOSPITAL | Age: 40
Setting detail: THERAPIES SERIES
Discharge: HOME | End: 2020-01-10

## 2020-01-10 DIAGNOSIS — Z98.890 STATUS POST LABRAL REPAIR OF SHOULDER: Primary | ICD-10-CM

## 2020-01-10 PROCEDURE — 97140 MANUAL THERAPY 1/> REGIONS: CPT

## 2020-01-10 NOTE — THERAPY TREATMENT NOTE
Outpatient Physical Therapy Ortho Treatment Note   [facility]     Patient Name: Abelino Austin  : 1980  MRN: [id number]  Today's Date: 1/10/2020      Visit Date: 01/10/2020    Visit Dx:    ICD-10-CM ICD-9-CM   1. Status post labral repair of shoulder Z98.890 V45.89       There is no problem list on file for this patient.       Past Medical History:   Diagnosis Date   • Anxiety    • Depression    • Substance abuse    • Suicidal intent    • Suicide attempt         No past surgical history on file.                    PT Assessment/Plan     Row Name 01/10/20 1200          PT Assessment    Assessment Comments  Patient came to his treatment session today reporting increased left shoulder soreness. ROM exercises and modalities were performed today due to this increased soreness. Patient denies any catching, clicking, or sensation of dislocating.  -AS        PT Plan    PT Plan Comments  Continue with current treatment plan.  -AS       User Key  (r) = Recorded By, (t) = Taken By, (c) = Cosigned By    Initials Name Provider Type    AS Alcides Carvalho, PT Physical Therapist          Modalities     Row Name 01/10/20 1200             Moist Heat    MH Applied  Yes  -AS      Location  Left Shoulder  -AS      Rx Minutes  10 mins  -AS      MH Prior to Rx  Yes  -AS        User Key  (r) = Recorded By, (t) = Taken By, (c) = Cosigned By    Initials Name Provider Type    AS Alcides Carvalho, PT Physical Therapist        OP Exercises     Row Name 01/10/20 1200             Subjective Comments    Subjective Comments  Patient states his shoulder is sore today. He states his car broke down and he had to push it out of the road and this made his shoulder sore.  -AS         Exercise 1    Exercise Name 1  3-Way Cane  -AS      Reps 1  25 each  -AS         Exercise 2    Exercise Name 2  Pulleys - Flex & ABD  -AS      Time 2  5 min each  -AS         Exercise 3    Exercise Name 3  S/L ER  -AS         Exercise 4     Exercise Name 4  Prone I, Y, T  -AS         Exercise 5    Exercise Name 5  Empty/Full Can  -AS         Exercise 6    Exercise Name 6  Rows  -AS         Exercise 7    Exercise Name 7  Extensions  -AS         Exercise 8    Exercise Name 8  IR  -AS         Exercise 9    Exercise Name 9  ER  -AS        User Key  (r) = Recorded By, (t) = Taken By, (c) = Cosigned By    Initials Name Provider Type    AS Alcides Carvalho, PT Physical Therapist                      Manual Rx (last 36 hours)      Manual Treatments     Row Name 01/10/20 1100             Manual Rx 1    Manual Rx 1 Location  Left Shoulder  -AS      Manual Rx 1 Type  PROM - Flex, ABD, IR, ER  -AS      Manual Rx 1 Duration  15 min  -AS        User Key  (r) = Recorded By, (t) = Taken By, (c) = Cosigned By    Initials Name Provider Type    AS Alcides Carvalho, PT Physical Therapist                             Time Calculation:   Start Time: 1026  Stop Time: 1103  Time Calculation (min): 37 min  Therapy Charges for Today     Code Description Service Date Service Provider Modifiers Qty    77713062916  PT MANUAL THERAPY EA 15 MIN 1/10/2020 Alcides Carvalho, PT GP 1                    Alcides Carvalho, PT  1/10/2020

## 2020-01-15 ENCOUNTER — HOSPITAL ENCOUNTER (OUTPATIENT)
Dept: PHYSICAL THERAPY | Facility: HOSPITAL | Age: 40
Setting detail: THERAPIES SERIES
Discharge: HOME | End: 2020-01-15

## 2020-01-15 DIAGNOSIS — Z98.890 STATUS POST LABRAL REPAIR OF SHOULDER: Primary | ICD-10-CM

## 2020-01-15 PROCEDURE — 97110 THERAPEUTIC EXERCISES: CPT

## 2020-01-15 PROCEDURE — 97140 MANUAL THERAPY 1/> REGIONS: CPT

## 2020-01-15 NOTE — THERAPY RE-EVALUATION
Outpatient Physical Therapy Ortho Re-Evaluation   [facility]     Patient Name: Abelino Austin  : 1980  MRN: [id number]  Today's Date: 1/15/2020      Visit Date: 01/15/2020    There is no problem list on file for this patient.       Past Medical History:   Diagnosis Date   • Anxiety    • Depression    • Substance abuse    • Suicidal intent    • Suicide attempt         No past surgical history on file.    Visit Dx:     ICD-10-CM ICD-9-CM   1. Status post labral repair of shoulder Z98.890 V45.89             PT Ortho     Row Name 01/15/20 1000       Precautions and Contraindications    Precautions/Limitations  no known precautions/limitations  -AS       Subjective Pain    Able to rate subjective pain?  yes  -AS    Pre-Treatment Pain Level  2  -AS    Post-Treatment Pain Level  1  -AS       Posture/Observations    Posture- WNL  Posture is WNL  -AS       Left Upper Ext    Lt Shoulder Abduction AROM  164  -AS    Lt Shoulder Abduction PROM  WNL  -AS    Lt Shoulder Flexion AROM  163  -AS    Lt Shoulder Flexion PROM  WNL  -AS    Lt Shoulder External Rotation AROM  74  -AS    Lt Shoulder External Rotation PROM  WNL  -AS    Lt Shoulder Internal Rotation AROM  70  -AS    Lt Shoulder Internal Rotation PROM  WNL  -AS       MMT Left Upper Ext    Lt Shoulder Flexion MMT, Gross Movement  (4-/5) good minus  -AS    Lt Shoulder ABduction MMT, Gross Movement  (4-/5) good minus  -AS    Lt Shoulder Internal Rotation MMT, Gross Movement  (4-/5) good minus  -AS    Lt Shoulder External Rotation MMT, Gross Movement  (4-/5) good minus  -AS       Sensation    Sensation WNL?  WNL  -AS    Light Touch  No apparent deficits  -AS      User Key  (r) = Recorded By, (t) = Taken By, (c) = Cosigned By    Initials Name Provider Type    AS Alcides Carvalho, PT Physical Therapist                            PT OP Goals     Row Name 01/15/20 1000          PT Short Term Goals    STG Date to Achieve  20  -AS     STG 1  Patient to  demonstrate compliance with initial HEP for flexibility, ROM, and strengthening.  -AS     STG 1 Progress  Met  -AS     STG 2  Patient to report left shoulder pain on VAS of 4-5/10 at worst with activity.   -AS     STG 2 Progress  Ongoing;Progressing  -AS     STG 3  Patient to demonstrate improved left shoulder PROM to WNL in all planes.  -AS     STG 3 Progress  Met  -AS     STG 4  Patient to demonstrate improved left shoulder strength to 4/5 in all planes.  -AS     STG 4 Progress  New;Ongoing;Progressing  -AS        Long Term Goals    LTG Date to Achieve  02/26/20  -AS     LTG 1  Patient to demonstrate compliance with advanced HEP for flexibility, ROM, and strengthening.  -AS     LTG 1 Progress  Ongoing;Progressing  -AS     LTG 2  Patient to report left shoulder pain on VAS of 0-1/10 at worst with activity.   -AS     LTG 2 Progress  Ongoing;Progressing  -AS     LTG 3  Patient to demonstrate improved left shoulder AROM to WNL in all planes.  -AS     LTG 3 Progress  Partially Met;Ongoing;Progressing  -AS     LTG 4  Patient to demonstrate improved left shoulder strength to 4+/5 in all planes.  -AS     LTG 4 Progress  New;Ongoing;Progressing  -AS     LTG 5  Patient to report improved function and decreased left shoulder pain on Quick DASH by >20-30 points.  -AS     LTG 5 Progress  Ongoing;Progressing  -AS        Time Calculation    PT Goal Re-Cert Due Date  02/12/20  -AS       User Key  (r) = Recorded By, (t) = Taken By, (c) = Cosigned By    Initials Name Provider Type    AS Alcides Carvalho, PT Physical Therapist          PT Assessment/Plan     Row Name 01/15/20 1000          PT Assessment    Functional Limitations  Performance in work activities;Performance in sport activities  -AS     Impairments  Muscle strength;Pain;Range of motion  -AS     Assessment Comments  Patient continues to make good progress with his left shoulder ROM and strength.  -AS     Please refer to paper survey for additional self-reported  information  Yes  -AS     Rehab Potential  Good  -AS     Patient/caregiver participated in establishment of treatment plan and goals  Yes  -AS     Patient would benefit from skilled therapy intervention  Yes  -AS        PT Plan    PT Frequency  2x/week  -AS     Predicted Duration of Therapy Intervention (Therapy Eval)  6-8 weeks  -AS     Planned CPT's?  PT RE-EVAL: 39139;PT THER PROC EA 15 MIN: 99466;PT THER ACT EA 15 MIN: 20842;PT MANUAL THERAPY EA 15 MIN: 50509;PT NEUROMUSC RE-EDUCATION EA 15 MIN: 64944;PT ELECTRICAL STIM UNATTEND: ;PT ULTRASOUND EA 15 MIN: 44506;PT HOT/COLD PACK WC NONMCARE: 67318  -AS     PT Plan Comments  Continue with current treatment plan.  -AS       User Key  (r) = Recorded By, (t) = Taken By, (c) = Cosigned By    Initials Name Provider Type    AS Alcides Carvalho, PT Physical Therapist          Modalities     Row Name 01/15/20 1000             Moist Heat    MH Applied  Yes  -AS      Location  Left Shoulder  -AS      Rx Minutes  10 mins  -AS      MH Prior to Rx  Yes  -AS        User Key  (r) = Recorded By, (t) = Taken By, (c) = Cosigned By    Initials Name Provider Type    AS Alcides Carvalho, PT Physical Therapist        OP Exercises     Row Name 01/15/20 1000             Subjective Comments    Subjective Comments  Patient states his MD is pleased with his progress at this time. He states his MD wants to continue with strengthening exercises.  -AS         Subjective Pain    Able to rate subjective pain?  yes  -AS      Pre-Treatment Pain Level  2  -AS      Post-Treatment Pain Level  1  -AS         Exercise 1    Exercise Name 1  3-Way Cane  -AS      Reps 1  25 each  -AS         Exercise 2    Exercise Name 2  Pulleys - Flex & ABD  -AS      Time 2  5 min each  -AS         Exercise 3    Exercise Name 3  S/L ER  -AS      Reps 3  40  -AS         Exercise 4    Exercise Name 4  Prone I, Y, T  -AS         Exercise 5    Exercise Name 5  Empty/Full Can  -AS      Reps 5  40 each  -AS          Exercise 6    Exercise Name 6  Rows  -AS      Reps 6  40  -AS      Additional Comments  Blue  -AS         Exercise 7    Exercise Name 7  Extensions  -AS      Reps 7  40  -AS      Additional Comments  Blue  -AS         Exercise 8    Exercise Name 8  IR  -AS      Reps 8  40  -AS      Additional Comments  Blue  -AS         Exercise 9    Exercise Name 9  ER  -AS      Reps 9  40  -AS      Additional Comments  Blue  -AS        User Key  (r) = Recorded By, (t) = Taken By, (c) = Cosigned By    Initials Name Provider Type    AS Alcides Carvalho, PT Physical Therapist           Manual Rx (last 36 hours)      Manual Treatments     Row Name 01/15/20 1100             Manual Rx 1    Manual Rx 1 Location  Left Shoulder  -AS      Manual Rx 1 Type  PROM - Flex, ABD, IR, ER  -AS      Manual Rx 1 Duration  15 min  -AS        User Key  (r) = Recorded By, (t) = Taken By, (c) = Cosigned By    Initials Name Provider Type    AS Alcides Carvalho, PT Physical Therapist                                Time Calculation:     Start Time: 1008  Stop Time: 1107  Time Calculation (min): 59 min     Therapy Charges for Today     Code Description Service Date Service Provider Modifiers Qty    86885278987  PT THER PROC EA 15 MIN 1/15/2020 Alcides Carvalho, PT GP 1    [id number]  PT MANUAL THERAPY EA 15 MIN 1/15/2020 Alcides Carvalho, PT GP 1                    Alcides Carvalho, PT  1/15/2020

## 2020-01-16 ENCOUNTER — APPOINTMENT (OUTPATIENT)
Dept: GENERAL RADIOLOGY | Facility: HOSPITAL | Age: 40
End: 2020-01-16

## 2020-01-16 ENCOUNTER — APPOINTMENT (OUTPATIENT)
Dept: CT IMAGING | Facility: HOSPITAL | Age: 40
End: 2020-01-16

## 2020-01-16 ENCOUNTER — HOSPITAL ENCOUNTER (INPATIENT)
Facility: HOSPITAL | Age: 40
LOS: 10 days | Discharge: PSYCHIATRIC OR SUBSTANCE USE FACILITY | End: 2020-01-26
Attending: EMERGENCY MEDICINE | Admitting: HOSPITALIST

## 2020-01-16 DIAGNOSIS — A41.9 SEPSIS WITHOUT ACUTE ORGAN DYSFUNCTION, DUE TO UNSPECIFIED ORGANISM (HCC): ICD-10-CM

## 2020-01-16 DIAGNOSIS — R77.8 ELEVATED TROPONIN: ICD-10-CM

## 2020-01-16 DIAGNOSIS — R41.841 COGNITIVE COMMUNICATION DEFICIT: ICD-10-CM

## 2020-01-16 DIAGNOSIS — J69.0 ASPIRATION PNEUMONIA OF RIGHT LOWER LOBE, UNSPECIFIED ASPIRATION PNEUMONIA TYPE (HCC): ICD-10-CM

## 2020-01-16 DIAGNOSIS — M62.82 NON-TRAUMATIC RHABDOMYOLYSIS: ICD-10-CM

## 2020-01-16 DIAGNOSIS — T50.904A DRUG OVERDOSE, UNDETERMINED INTENT, INITIAL ENCOUNTER: Primary | ICD-10-CM

## 2020-01-16 DIAGNOSIS — N17.9 ACUTE KIDNEY INJURY (HCC): ICD-10-CM

## 2020-01-16 DIAGNOSIS — R41.82 ALTERED MENTAL STATUS, UNSPECIFIED ALTERED MENTAL STATUS TYPE: ICD-10-CM

## 2020-01-16 PROBLEM — T50.901A OVERDOSE: Status: ACTIVE | Noted: 2020-01-16

## 2020-01-16 LAB
ALBUMIN SERPL-MCNC: 4.8 G/DL (ref 3.5–5.2)
ALBUMIN/GLOB SERPL: 1.5 G/DL
ALP SERPL-CCNC: 80 U/L (ref 39–117)
ALT SERPL W P-5'-P-CCNC: 36 U/L (ref 1–41)
AMPHET+METHAMPHET UR QL: NEGATIVE
AMPHETAMINES UR QL: NEGATIVE
ANION GAP SERPL CALCULATED.3IONS-SCNC: 19.2 MMOL/L (ref 5–15)
APAP SERPL-MCNC: <5 MCG/ML (ref 10–30)
APTT PPP: 28.7 SECONDS (ref 24.3–38.1)
ARTERIAL PATENCY WRIST A: POSITIVE
AST SERPL-CCNC: 63 U/L (ref 1–40)
ATMOSPHERIC PRESS: 754 MMHG
ATMOSPHERIC PRESS: 755 MMHG
ATMOSPHERIC PRESS: 756 MMHG
BACTERIA UR QL AUTO: ABNORMAL /HPF
BARBITURATES UR QL SCN: NEGATIVE
BASE EXCESS BLDA CALC-SCNC: -3.6 MMOL/L (ref 0–2)
BASE EXCESS BLDA CALC-SCNC: -3.7 MMOL/L (ref 0–2)
BASE EXCESS BLDA CALC-SCNC: -4.2 MMOL/L (ref 0–2)
BASOPHILS # BLD AUTO: 0.14 10*3/MM3 (ref 0–0.2)
BASOPHILS NFR BLD AUTO: 0.7 % (ref 0–1.5)
BDY SITE: ABNORMAL
BENZODIAZ UR QL SCN: NEGATIVE
BILIRUB SERPL-MCNC: 0.4 MG/DL (ref 0.2–1.2)
BILIRUB UR QL STRIP: ABNORMAL
BODY TEMPERATURE: 37 C
BUN BLD-MCNC: 31 MG/DL (ref 6–20)
BUN/CREAT SERPL: 10.9 (ref 7–25)
BUPRENORPHINE SERPL-MCNC: NEGATIVE NG/ML
CALCIUM SPEC-SCNC: 8.9 MG/DL (ref 8.6–10.5)
CANNABINOIDS SERPL QL: NEGATIVE
CHLORIDE SERPL-SCNC: 100 MMOL/L (ref 98–107)
CK SERPL-CCNC: 1819 U/L (ref 20–200)
CLARITY UR: ABNORMAL
CO2 SERPL-SCNC: 17.8 MMOL/L (ref 22–29)
COCAINE UR QL: NEGATIVE
COD CRY URNS QL: ABNORMAL /HPF
COLOR UR: ABNORMAL
CREAT BLD-MCNC: 2.84 MG/DL (ref 0.76–1.27)
D-LACTATE SERPL-SCNC: 0.9 MMOL/L (ref 0.5–2)
D-LACTATE SERPL-SCNC: 3.7 MMOL/L (ref 0.5–2)
DEPRECATED RDW RBC AUTO: 45.7 FL (ref 37–54)
EOSINOPHIL # BLD AUTO: 0 10*3/MM3 (ref 0–0.4)
EOSINOPHIL NFR BLD AUTO: 0 % (ref 0.3–6.2)
ERYTHROCYTE [DISTWIDTH] IN BLOOD BY AUTOMATED COUNT: 12.8 % (ref 12.3–15.4)
ETHANOL BLD-MCNC: <10 MG/DL (ref 0–10)
ETHANOL UR QL: <0.01 %
GFR SERPL CREATININE-BSD FRML MDRD: 25 ML/MIN/1.73
GLOBULIN UR ELPH-MCNC: 3.2 GM/DL
GLUCOSE BLD-MCNC: 123 MG/DL (ref 65–99)
GLUCOSE UR STRIP-MCNC: NEGATIVE MG/DL
HCO3 BLDA-SCNC: 22.3 MMOL/L (ref 20–26)
HCO3 BLDA-SCNC: 22.8 MMOL/L (ref 20–26)
HCO3 BLDA-SCNC: 23.3 MMOL/L (ref 20–26)
HCT VFR BLD AUTO: 47.7 % (ref 37.5–51)
HGB BLD-MCNC: 15.6 G/DL (ref 13–17.7)
HGB BLDA-MCNC: 13.9 G/DL (ref 14–18)
HGB BLDA-MCNC: 14 G/DL (ref 14–18)
HGB BLDA-MCNC: 14.5 G/DL (ref 14–18)
HGB UR QL STRIP.AUTO: ABNORMAL
HOLD SPECIMEN: NORMAL
HOROWITZ INDEX BLD+IHG-RTO: 50 %
HYALINE CASTS UR QL AUTO: ABNORMAL /LPF
IMM GRANULOCYTES # BLD AUTO: 0.89 10*3/MM3 (ref 0–0.05)
IMM GRANULOCYTES NFR BLD AUTO: 4.5 % (ref 0–0.5)
INR PPP: 1.01 (ref 0.9–1.1)
KETONES UR QL STRIP: NEGATIVE
LEUKOCYTE ESTERASE UR QL STRIP.AUTO: NEGATIVE
LYMPHOCYTES # BLD AUTO: 1.89 10*3/MM3 (ref 0.7–3.1)
LYMPHOCYTES NFR BLD AUTO: 9.5 % (ref 19.6–45.3)
MAGNESIUM SERPL-MCNC: 2 MG/DL (ref 1.6–2.6)
MCH RBC QN AUTO: 32 PG (ref 26.6–33)
MCHC RBC AUTO-ENTMCNC: 32.7 G/DL (ref 31.5–35.7)
MCV RBC AUTO: 97.7 FL (ref 79–97)
METHADONE UR QL SCN: NEGATIVE
MODALITY: ABNORMAL
MONOCYTES # BLD AUTO: 2.24 10*3/MM3 (ref 0.1–0.9)
MONOCYTES NFR BLD AUTO: 11.3 % (ref 5–12)
NEUTROPHILS # BLD AUTO: 14.73 10*3/MM3 (ref 1.7–7)
NEUTROPHILS NFR BLD AUTO: 74 % (ref 42.7–76)
NITRITE UR QL STRIP: NEGATIVE
NRBC BLD AUTO-RTO: 0 /100 WBC (ref 0–0.2)
OPIATES UR QL: NEGATIVE
OXYCODONE UR QL SCN: NEGATIVE
PCO2 BLDA: 45.3 MM HG (ref 35–45)
PCO2 BLDA: 45.8 MM HG (ref 35–45)
PCO2 BLDA: 48.3 MM HG (ref 35–45)
PCO2 TEMP ADJ BLD: 45.3 MM HG (ref 35–45)
PCO2 TEMP ADJ BLD: 45.8 MM HG (ref 35–45)
PCO2 TEMP ADJ BLD: 48.3 MM HG (ref 35–45)
PCP UR QL SCN: NEGATIVE
PEEP RESPIRATORY: 5 CM[H2O]
PH BLDA: 7.29 PH UNITS (ref 7.35–7.45)
PH BLDA: 7.3 PH UNITS (ref 7.35–7.45)
PH BLDA: 7.31 PH UNITS (ref 7.35–7.45)
PH UR STRIP.AUTO: <=5 [PH] (ref 4.5–8)
PH, TEMP CORRECTED: 7.29 PH UNITS (ref 7.35–7.45)
PH, TEMP CORRECTED: 7.3 PH UNITS (ref 7.35–7.45)
PH, TEMP CORRECTED: 7.31 PH UNITS (ref 7.35–7.45)
PHOSPHATE SERPL-MCNC: 4.6 MG/DL (ref 2.5–4.5)
PLATELET # BLD AUTO: 234 10*3/MM3 (ref 140–450)
PMV BLD AUTO: 9.2 FL (ref 6–12)
PO2 BLDA: 68.4 MM HG (ref 83–108)
PO2 BLDA: 74.4 MM HG (ref 83–108)
PO2 BLDA: 78.4 MM HG (ref 83–108)
PO2 TEMP ADJ BLD: 68.4 MM HG (ref 83–108)
PO2 TEMP ADJ BLD: 74.4 MM HG (ref 83–108)
PO2 TEMP ADJ BLD: 78.4 MM HG (ref 83–108)
POTASSIUM BLD-SCNC: 4.7 MMOL/L (ref 3.5–5.2)
PROPOXYPH UR QL: NEGATIVE
PROT SERPL-MCNC: 8 G/DL (ref 6–8.5)
PROT UR QL STRIP: ABNORMAL
PROTHROMBIN TIME: 13 SECONDS (ref 12.1–15)
RBC # BLD AUTO: 4.88 10*6/MM3 (ref 4.14–5.8)
RBC # UR: ABNORMAL /HPF
REF LAB TEST METHOD: ABNORMAL
SALICYLATES SERPL-MCNC: <0.3 MG/DL
SAO2 % BLDCOA: 92.6 % (ref 94–99)
SAO2 % BLDCOA: 94.1 % (ref 94–99)
SAO2 % BLDCOA: 95.3 % (ref 94–99)
SET MECH RESP RATE: 20
SODIUM BLD-SCNC: 137 MMOL/L (ref 136–145)
SP GR UR STRIP: 1.02 (ref 1–1.03)
SQUAMOUS #/AREA URNS HPF: ABNORMAL /HPF
TRICYCLICS UR QL SCN: POSITIVE
TROPONIN T SERPL-MCNC: 0.23 NG/ML (ref 0–0.03)
TSH SERPL DL<=0.05 MIU/L-ACNC: 0.81 UIU/ML (ref 0.27–4.2)
UROBILINOGEN UR QL STRIP: ABNORMAL
VENTILATOR MODE: AC
VT ON VENT VENT: 500 ML
VT ON VENT VENT: 500 ML
VT ON VENT VENT: 510 ML
WBC NRBC COR # BLD: 19.89 10*3/MM3 (ref 3.4–10.8)
WBC UR QL AUTO: ABNORMAL /HPF

## 2020-01-16 PROCEDURE — 99292 CRITICAL CARE ADDL 30 MIN: CPT | Performed by: EMERGENCY MEDICINE

## 2020-01-16 PROCEDURE — 85025 COMPLETE CBC W/AUTO DIFF WBC: CPT | Performed by: EMERGENCY MEDICINE

## 2020-01-16 PROCEDURE — 84100 ASSAY OF PHOSPHORUS: CPT | Performed by: HOSPITALIST

## 2020-01-16 PROCEDURE — 80307 DRUG TEST PRSMV CHEM ANLYZR: CPT | Performed by: EMERGENCY MEDICINE

## 2020-01-16 PROCEDURE — 70450 CT HEAD/BRAIN W/O DYE: CPT

## 2020-01-16 PROCEDURE — 99223 1ST HOSP IP/OBS HIGH 75: CPT | Performed by: HOSPITALIST

## 2020-01-16 PROCEDURE — 85610 PROTHROMBIN TIME: CPT | Performed by: EMERGENCY MEDICINE

## 2020-01-16 PROCEDURE — 84443 ASSAY THYROID STIM HORMONE: CPT | Performed by: HOSPITALIST

## 2020-01-16 PROCEDURE — 25010000002 MEROPENEM PER 100 MG: Performed by: EMERGENCY MEDICINE

## 2020-01-16 PROCEDURE — 36600 WITHDRAWAL OF ARTERIAL BLOOD: CPT

## 2020-01-16 PROCEDURE — 0BH17EZ INSERTION OF ENDOTRACHEAL AIRWAY INTO TRACHEA, VIA NATURAL OR ARTIFICIAL OPENING: ICD-10-PCS | Performed by: EMERGENCY MEDICINE

## 2020-01-16 PROCEDURE — 25010000002 FENTANYL CITRATE (PF) 100 MCG/2ML SOLUTION

## 2020-01-16 PROCEDURE — 85730 THROMBOPLASTIN TIME PARTIAL: CPT | Performed by: EMERGENCY MEDICINE

## 2020-01-16 PROCEDURE — 25010000002 ENOXAPARIN PER 10 MG: Performed by: HOSPITALIST

## 2020-01-16 PROCEDURE — 25010000002 MIDAZOLAM PER 1MG

## 2020-01-16 PROCEDURE — 83605 ASSAY OF LACTIC ACID: CPT | Performed by: EMERGENCY MEDICINE

## 2020-01-16 PROCEDURE — 94799 UNLISTED PULMONARY SVC/PX: CPT

## 2020-01-16 PROCEDURE — 84484 ASSAY OF TROPONIN QUANT: CPT | Performed by: HOSPITALIST

## 2020-01-16 PROCEDURE — 71045 X-RAY EXAM CHEST 1 VIEW: CPT

## 2020-01-16 PROCEDURE — 82803 BLOOD GASES ANY COMBINATION: CPT

## 2020-01-16 PROCEDURE — 31500 INSERT EMERGENCY AIRWAY: CPT

## 2020-01-16 PROCEDURE — 99291 CRITICAL CARE FIRST HOUR: CPT | Performed by: EMERGENCY MEDICINE

## 2020-01-16 PROCEDURE — 82550 ASSAY OF CK (CPK): CPT | Performed by: EMERGENCY MEDICINE

## 2020-01-16 PROCEDURE — 99285 EMERGENCY DEPT VISIT HI MDM: CPT

## 2020-01-16 PROCEDURE — 31500 INSERT EMERGENCY AIRWAY: CPT | Performed by: EMERGENCY MEDICINE

## 2020-01-16 PROCEDURE — 83735 ASSAY OF MAGNESIUM: CPT | Performed by: HOSPITALIST

## 2020-01-16 PROCEDURE — 93005 ELECTROCARDIOGRAM TRACING: CPT | Performed by: EMERGENCY MEDICINE

## 2020-01-16 PROCEDURE — 93010 ELECTROCARDIOGRAM REPORT: CPT | Performed by: INTERNAL MEDICINE

## 2020-01-16 PROCEDURE — 5A1945Z RESPIRATORY VENTILATION, 24-96 CONSECUTIVE HOURS: ICD-10-PCS | Performed by: EMERGENCY MEDICINE

## 2020-01-16 PROCEDURE — 84484 ASSAY OF TROPONIN QUANT: CPT | Performed by: EMERGENCY MEDICINE

## 2020-01-16 PROCEDURE — 81001 URINALYSIS AUTO W/SCOPE: CPT | Performed by: EMERGENCY MEDICINE

## 2020-01-16 PROCEDURE — 87040 BLOOD CULTURE FOR BACTERIA: CPT | Performed by: EMERGENCY MEDICINE

## 2020-01-16 PROCEDURE — 25010000002 PIPERACILLIN SOD-TAZOBACTAM PER 1 G

## 2020-01-16 PROCEDURE — 94002 VENT MGMT INPAT INIT DAY: CPT

## 2020-01-16 PROCEDURE — 80307 DRUG TEST PRSMV CHEM ANLYZR: CPT | Performed by: NURSE PRACTITIONER

## 2020-01-16 PROCEDURE — 80053 COMPREHEN METABOLIC PANEL: CPT | Performed by: EMERGENCY MEDICINE

## 2020-01-16 PROCEDURE — 25010000002 PROPOFOL 10 MG/ML EMULSION

## 2020-01-16 RX ORDER — PIPERACILLIN SODIUM, TAZOBACTAM SODIUM 3; .375 G/15ML; G/15ML
INJECTION, POWDER, LYOPHILIZED, FOR SOLUTION INTRAVENOUS
Status: COMPLETED
Start: 2020-01-16 | End: 2020-01-16

## 2020-01-16 RX ORDER — TIZANIDINE 4 MG/1
4 TABLET ORAL NIGHTLY PRN
COMMUNITY
End: 2020-01-26 | Stop reason: HOSPADM

## 2020-01-16 RX ORDER — PROPOFOL 10 MG/ML
VIAL (ML) INTRAVENOUS
Status: DISCONTINUED
Start: 2020-01-16 | End: 2020-01-16 | Stop reason: WASHOUT

## 2020-01-16 RX ORDER — SODIUM CHLORIDE 9 MG/ML
40 INJECTION, SOLUTION INTRAVENOUS AS NEEDED
Status: DISCONTINUED | OUTPATIENT
Start: 2020-01-16 | End: 2020-01-26 | Stop reason: HOSPADM

## 2020-01-16 RX ORDER — SODIUM CHLORIDE 9 MG/ML
INJECTION, SOLUTION INTRAVENOUS
Status: DISPENSED
Start: 2020-01-16 | End: 2020-01-17

## 2020-01-16 RX ORDER — PROPOFOL 10 MG/ML
VIAL (ML) INTRAVENOUS
Status: COMPLETED
Start: 2020-01-16 | End: 2020-01-16

## 2020-01-16 RX ORDER — MEROPENEM 1 G/1
INJECTION, POWDER, FOR SOLUTION INTRAVENOUS
Status: DISPENSED
Start: 2020-01-16 | End: 2020-01-17

## 2020-01-16 RX ORDER — NALOXONE HYDROCHLORIDE 1 MG/ML
2 INJECTION INTRAMUSCULAR; INTRAVENOUS; SUBCUTANEOUS ONCE
Status: COMPLETED | OUTPATIENT
Start: 2020-01-16 | End: 2020-01-16

## 2020-01-16 RX ORDER — ONDANSETRON 4 MG/1
4 TABLET, FILM COATED ORAL EVERY 8 HOURS PRN
COMMUNITY
End: 2020-01-26 | Stop reason: HOSPADM

## 2020-01-16 RX ORDER — SODIUM CHLORIDE, SODIUM LACTATE, POTASSIUM CHLORIDE, CALCIUM CHLORIDE 600; 310; 30; 20 MG/100ML; MG/100ML; MG/100ML; MG/100ML
50 INJECTION, SOLUTION INTRAVENOUS CONTINUOUS
Status: DISCONTINUED | OUTPATIENT
Start: 2020-01-16 | End: 2020-01-20

## 2020-01-16 RX ORDER — OXYBUTYNIN CHLORIDE 5 MG/1
5 TABLET ORAL DAILY
COMMUNITY
End: 2020-01-26 | Stop reason: HOSPADM

## 2020-01-16 RX ORDER — TRAMADOL HYDROCHLORIDE 50 MG/1
50 TABLET ORAL EVERY 6 HOURS PRN
COMMUNITY
End: 2020-01-26 | Stop reason: HOSPADM

## 2020-01-16 RX ORDER — FENTANYL CITRATE 50 UG/ML
25 INJECTION, SOLUTION INTRAMUSCULAR; INTRAVENOUS
Status: DISCONTINUED | OUTPATIENT
Start: 2020-01-16 | End: 2020-01-22

## 2020-01-16 RX ORDER — ETOMIDATE 2 MG/ML
INJECTION INTRAVENOUS
Status: COMPLETED
Start: 2020-01-16 | End: 2020-01-16

## 2020-01-16 RX ORDER — SODIUM CHLORIDE 0.9 % (FLUSH) 0.9 %
10 SYRINGE (ML) INJECTION AS NEEDED
Status: DISCONTINUED | OUTPATIENT
Start: 2020-01-16 | End: 2020-01-26 | Stop reason: HOSPADM

## 2020-01-16 RX ORDER — ROCURONIUM BROMIDE 10 MG/ML
65 INJECTION, SOLUTION INTRAVENOUS ONCE
Status: COMPLETED | OUTPATIENT
Start: 2020-01-16 | End: 2020-01-16

## 2020-01-16 RX ORDER — ROCURONIUM BROMIDE 10 MG/ML
INJECTION, SOLUTION INTRAVENOUS
Status: COMPLETED
Start: 2020-01-16 | End: 2020-01-16

## 2020-01-16 RX ORDER — MIDAZOLAM HYDROCHLORIDE 1 MG/ML
INJECTION INTRAMUSCULAR; INTRAVENOUS
Status: COMPLETED
Start: 2020-01-16 | End: 2020-01-16

## 2020-01-16 RX ORDER — MIDAZOLAM HYDROCHLORIDE 1 MG/ML
1 INJECTION INTRAMUSCULAR; INTRAVENOUS
Status: DISCONTINUED | OUTPATIENT
Start: 2020-01-16 | End: 2020-01-17

## 2020-01-16 RX ORDER — SODIUM CHLORIDE 0.9 % (FLUSH) 0.9 %
10 SYRINGE (ML) INJECTION EVERY 12 HOURS SCHEDULED
Status: DISCONTINUED | OUTPATIENT
Start: 2020-01-16 | End: 2020-01-26 | Stop reason: HOSPADM

## 2020-01-16 RX ORDER — ETOMIDATE 2 MG/ML
10 INJECTION INTRAVENOUS ONCE
Status: COMPLETED | OUTPATIENT
Start: 2020-01-16 | End: 2020-01-16

## 2020-01-16 RX ORDER — BUPROPION HYDROCHLORIDE 150 MG/1
150 TABLET, EXTENDED RELEASE ORAL 2 TIMES DAILY
COMMUNITY

## 2020-01-16 RX ORDER — MELOXICAM 15 MG/1
15 TABLET ORAL DAILY
COMMUNITY
End: 2020-01-26 | Stop reason: HOSPADM

## 2020-01-16 RX ORDER — METAXALONE 800 MG/1
800 TABLET ORAL 3 TIMES DAILY PRN
COMMUNITY
End: 2020-01-26 | Stop reason: HOSPADM

## 2020-01-16 RX ORDER — FENTANYL CITRATE 50 UG/ML
INJECTION, SOLUTION INTRAMUSCULAR; INTRAVENOUS
Status: COMPLETED
Start: 2020-01-16 | End: 2020-01-16

## 2020-01-16 RX ADMIN — SODIUM CHLORIDE 1000 ML: 9 INJECTION, SOLUTION INTRAVENOUS at 18:05

## 2020-01-16 RX ADMIN — ROCURONIUM BROMIDE 65 MG: 10 INJECTION, SOLUTION INTRAVENOUS at 21:06

## 2020-01-16 RX ADMIN — ETOMIDATE 10 MG: 2 INJECTION INTRAVENOUS at 21:05

## 2020-01-16 RX ADMIN — SODIUM CHLORIDE 1000 ML: 900 INJECTION, SOLUTION INTRAVENOUS at 19:20

## 2020-01-16 RX ADMIN — ROCURONIUM BROMIDE 65 MG: 10 INJECTION INTRAVENOUS at 21:06

## 2020-01-16 RX ADMIN — PROPOFOL 5 MCG/KG/MIN: 10 INJECTION, EMULSION INTRAVENOUS at 19:30

## 2020-01-16 RX ADMIN — FENTANYL CITRATE 25 MCG: 50 INJECTION INTRAMUSCULAR; INTRAVENOUS at 20:26

## 2020-01-16 RX ADMIN — ROCURONIUM BROMIDE 65 MG: 10 INJECTION INTRAVENOUS at 19:15

## 2020-01-16 RX ADMIN — ENOXAPARIN SODIUM 40 MG: 40 INJECTION SUBCUTANEOUS at 23:03

## 2020-01-16 RX ADMIN — MIDAZOLAM HYDROCHLORIDE 1 MG: 1 INJECTION, SOLUTION INTRAMUSCULAR; INTRAVENOUS at 20:25

## 2020-01-16 RX ADMIN — SODIUM CHLORIDE, POTASSIUM CHLORIDE, SODIUM LACTATE AND CALCIUM CHLORIDE 150 ML/HR: 600; 310; 30; 20 INJECTION, SOLUTION INTRAVENOUS at 23:02

## 2020-01-16 RX ADMIN — MEROPENEM 1 G: 1 INJECTION, POWDER, FOR SOLUTION INTRAVENOUS at 19:38

## 2020-01-16 RX ADMIN — PIPERACILLIN SODIUM AND TAZOBACTAM SODIUM 3375 MG: 3; .375 INJECTION, POWDER, LYOPHILIZED, FOR SOLUTION INTRAVENOUS at 23:06

## 2020-01-16 RX ADMIN — MIDAZOLAM HYDROCHLORIDE 1 MG: 1 INJECTION INTRAMUSCULAR; INTRAVENOUS at 20:25

## 2020-01-16 RX ADMIN — NALOXONE HYDROCHLORIDE 2 MG: 1 INJECTION PARENTERAL at 17:53

## 2020-01-16 RX ADMIN — ETOMIDATE 10 MG: 2 INJECTION, SOLUTION INTRAVENOUS at 21:05

## 2020-01-16 RX ADMIN — ETOMIDATE 18 MG: 2 INJECTION, SOLUTION INTRAVENOUS at 19:15

## 2020-01-16 RX ADMIN — NALOXONE HYDROCHLORIDE 2 MG: 1 INJECTION PARENTERAL at 17:55

## 2020-01-17 ENCOUNTER — APPOINTMENT (OUTPATIENT)
Dept: CARDIOLOGY | Facility: HOSPITAL | Age: 40
End: 2020-01-17

## 2020-01-17 ENCOUNTER — APPOINTMENT (OUTPATIENT)
Dept: GENERAL RADIOLOGY | Facility: HOSPITAL | Age: 40
End: 2020-01-17

## 2020-01-17 ENCOUNTER — APPOINTMENT (OUTPATIENT)
Dept: ULTRASOUND IMAGING | Facility: HOSPITAL | Age: 40
End: 2020-01-17

## 2020-01-17 ENCOUNTER — APPOINTMENT (OUTPATIENT)
Dept: PHYSICAL THERAPY | Facility: HOSPITAL | Age: 40
End: 2020-01-17

## 2020-01-17 LAB
ALBUMIN SERPL-MCNC: 4 G/DL (ref 3.5–5.2)
ALBUMIN/GLOB SERPL: 1.5 G/DL
ALP SERPL-CCNC: 66 U/L (ref 39–117)
ALT SERPL W P-5'-P-CCNC: 36 U/L (ref 1–41)
ANION GAP SERPL CALCULATED.3IONS-SCNC: 10.2 MMOL/L (ref 5–15)
ANION GAP SERPL CALCULATED.3IONS-SCNC: 9.5 MMOL/L (ref 5–15)
AORTIC DIMENSIONLESS INDEX: 0.8 (DI)
ARTERIAL PATENCY WRIST A: POSITIVE
AST SERPL-CCNC: 78 U/L (ref 1–40)
ATMOSPHERIC PRESS: 756 MMHG
BASE EXCESS BLDA CALC-SCNC: -0.5 MMOL/L (ref 0–2)
BASOPHILS # BLD AUTO: 0.07 10*3/MM3 (ref 0–0.2)
BASOPHILS NFR BLD AUTO: 0.4 % (ref 0–1.5)
BDY SITE: ABNORMAL
BH CV ECHO MEAS - ACS: 2.3 CM
BH CV ECHO MEAS - AO MAX PG (FULL): 2 MMHG
BH CV ECHO MEAS - AO MAX PG: 6.1 MMHG
BH CV ECHO MEAS - AO MEAN PG (FULL): 1 MMHG
BH CV ECHO MEAS - AO MEAN PG: 3 MMHG
BH CV ECHO MEAS - AO ROOT AREA (BSA CORRECTED): 1.8
BH CV ECHO MEAS - AO ROOT AREA: 10.8 CM^2
BH CV ECHO MEAS - AO ROOT DIAM: 3.7 CM
BH CV ECHO MEAS - AO V2 MAX: 123 CM/SEC
BH CV ECHO MEAS - AO V2 MEAN: 83.1 CM/SEC
BH CV ECHO MEAS - AO V2 VTI: 23.6 CM
BH CV ECHO MEAS - AVA(I,A): 3.1 CM^2
BH CV ECHO MEAS - AVA(I,D): 3.1 CM^2
BH CV ECHO MEAS - AVA(V,A): 3.1 CM^2
BH CV ECHO MEAS - AVA(V,D): 3.1 CM^2
BH CV ECHO MEAS - BSA(HAYCOCK): 2 M^2
BH CV ECHO MEAS - BSA: 2 M^2
BH CV ECHO MEAS - BZI_BMI: 23.9 KILOGRAMS/M^2
BH CV ECHO MEAS - BZI_METRIC_HEIGHT: 182.9 CM
BH CV ECHO MEAS - BZI_METRIC_WEIGHT: 79.8 KG
BH CV ECHO MEAS - EDV(CUBED): 167.3 ML
BH CV ECHO MEAS - EDV(MOD-SP2): 135 ML
BH CV ECHO MEAS - EDV(MOD-SP4): 137 ML
BH CV ECHO MEAS - EDV(TEICH): 148 ML
BH CV ECHO MEAS - EF(CUBED): 56 %
BH CV ECHO MEAS - EF(MOD-BP): 48 %
BH CV ECHO MEAS - EF(MOD-SP2): 48.4 %
BH CV ECHO MEAS - EF(MOD-SP4): 49.7 %
BH CV ECHO MEAS - EF(TEICH): 47.2 %
BH CV ECHO MEAS - ESV(CUBED): 73.6 ML
BH CV ECHO MEAS - ESV(MOD-SP2): 69.6 ML
BH CV ECHO MEAS - ESV(MOD-SP4): 68.9 ML
BH CV ECHO MEAS - ESV(TEICH): 78.1 ML
BH CV ECHO MEAS - FS: 24 %
BH CV ECHO MEAS - IVS/LVPW: 1.1
BH CV ECHO MEAS - IVSD: 0.87 CM
BH CV ECHO MEAS - LA DIMENSION: 3 CM
BH CV ECHO MEAS - LA/AO: 0.81
BH CV ECHO MEAS - LAT PEAK E' VEL: 19 CM/SEC
BH CV ECHO MEAS - LV DIASTOLIC VOL/BSA (35-75): 67.9 ML/M^2
BH CV ECHO MEAS - LV MASS(C)D: 171.6 GRAMS
BH CV ECHO MEAS - LV MASS(C)DI: 85 GRAMS/M^2
BH CV ECHO MEAS - LV MAX PG: 4.1 MMHG
BH CV ECHO MEAS - LV MEAN PG: 2 MMHG
BH CV ECHO MEAS - LV SYSTOLIC VOL/BSA (12-30): 34.1 ML/M^2
BH CV ECHO MEAS - LV V1 MAX: 101 CM/SEC
BH CV ECHO MEAS - LV V1 MEAN: 64.2 CM/SEC
BH CV ECHO MEAS - LV V1 VTI: 19.3 CM
BH CV ECHO MEAS - LVIDD: 5.5 CM
BH CV ECHO MEAS - LVIDS: 4.2 CM
BH CV ECHO MEAS - LVLD AP2: 8.9 CM
BH CV ECHO MEAS - LVLD AP4: 8.8 CM
BH CV ECHO MEAS - LVLS AP2: 8.1 CM
BH CV ECHO MEAS - LVLS AP4: 7.7 CM
BH CV ECHO MEAS - LVOT AREA (M): 3.8 CM^2
BH CV ECHO MEAS - LVOT AREA: 3.8 CM^2
BH CV ECHO MEAS - LVOT DIAM: 2.2 CM
BH CV ECHO MEAS - LVPWD: 0.82 CM
BH CV ECHO MEAS - MED PEAK E' VEL: 11 CM/SEC
BH CV ECHO MEAS - MV A DUR: 0.2 SEC
BH CV ECHO MEAS - MV A MAX VEL: 67.4 CM/SEC
BH CV ECHO MEAS - MV DEC SLOPE: 432 CM/SEC^2
BH CV ECHO MEAS - MV DEC TIME: 220 SEC
BH CV ECHO MEAS - MV E MAX VEL: 81.5 CM/SEC
BH CV ECHO MEAS - MV E/A: 1.2
BH CV ECHO MEAS - MV MAX PG: 4.2 MMHG
BH CV ECHO MEAS - MV MEAN PG: 1 MMHG
BH CV ECHO MEAS - MV P1/2T MAX VEL: 101 CM/SEC
BH CV ECHO MEAS - MV P1/2T: 68.5 MSEC
BH CV ECHO MEAS - MV V2 MAX: 102 CM/SEC
BH CV ECHO MEAS - MV V2 MEAN: 53.9 CM/SEC
BH CV ECHO MEAS - MV V2 VTI: 27.8 CM
BH CV ECHO MEAS - MVA P1/2T LCG: 2.2 CM^2
BH CV ECHO MEAS - MVA(P1/2T): 3.2 CM^2
BH CV ECHO MEAS - MVA(VTI): 2.6 CM^2
BH CV ECHO MEAS - PA ACC TIME: 0.12 SEC
BH CV ECHO MEAS - PA MAX PG (FULL): 1.6 MMHG
BH CV ECHO MEAS - PA MAX PG: 3.5 MMHG
BH CV ECHO MEAS - PA PR(ACCEL): 23.7 MMHG
BH CV ECHO MEAS - PA V2 MAX: 93.9 CM/SEC
BH CV ECHO MEAS - PULM A REVS DUR: 0.16 SEC
BH CV ECHO MEAS - PULM A REVS VEL: 32.5 CM/SEC
BH CV ECHO MEAS - PULM DIAS VEL: 54.3 CM/SEC
BH CV ECHO MEAS - PULM S/D: 1.2
BH CV ECHO MEAS - PULM SYS VEL: 65 CM/SEC
BH CV ECHO MEAS - PVA(V,A): 1.9 CM^2
BH CV ECHO MEAS - PVA(V,D): 1.9 CM^2
BH CV ECHO MEAS - QP/QS: 0.49
BH CV ECHO MEAS - RAP SYSTOLE: 8 MMHG
BH CV ECHO MEAS - RV MAX PG: 2 MMHG
BH CV ECHO MEAS - RV MEAN PG: 1 MMHG
BH CV ECHO MEAS - RV V1 MAX: 70.2 CM/SEC
BH CV ECHO MEAS - RV V1 MEAN: 44.6 CM/SEC
BH CV ECHO MEAS - RV V1 VTI: 14 CM
BH CV ECHO MEAS - RVOT AREA: 2.5 CM^2
BH CV ECHO MEAS - RVOT DIAM: 1.8 CM
BH CV ECHO MEAS - RVSP: 32.9 MMHG
BH CV ECHO MEAS - SI(AO): 125.8 ML/M^2
BH CV ECHO MEAS - SI(CUBED): 46.5 ML/M^2
BH CV ECHO MEAS - SI(LVOT): 36.4 ML/M^2
BH CV ECHO MEAS - SI(MOD-SP2): 32.4 ML/M^2
BH CV ECHO MEAS - SI(MOD-SP4): 33.8 ML/M^2
BH CV ECHO MEAS - SI(TEICH): 34.6 ML/M^2
BH CV ECHO MEAS - SV(AO): 253.8 ML
BH CV ECHO MEAS - SV(CUBED): 93.7 ML
BH CV ECHO MEAS - SV(LVOT): 73.4 ML
BH CV ECHO MEAS - SV(MOD-SP2): 65.4 ML
BH CV ECHO MEAS - SV(MOD-SP4): 68.1 ML
BH CV ECHO MEAS - SV(RVOT): 35.6 ML
BH CV ECHO MEAS - SV(TEICH): 69.9 ML
BH CV ECHO MEAS - TAPSE (>1.6): 2.4 CM
BH CV ECHO MEAS - TR MAX VEL: 248.5 CM/SEC
BH CV ECHO MEASUREMENTS AVERAGE E/E' RATIO: 5.43
BH CV VAS BP RIGHT ARM: NORMAL MMHG
BH CV XLRA - RV BASE: 3.2 CM
BH CV XLRA - TDI S': 15 CM/SEC
BILIRUB SERPL-MCNC: 0.5 MG/DL (ref 0.2–1.2)
BODY TEMPERATURE: 37 C
BUN BLD-MCNC: 19 MG/DL (ref 6–20)
BUN BLD-MCNC: 21 MG/DL (ref 6–20)
BUN/CREAT SERPL: 16.3 (ref 7–25)
BUN/CREAT SERPL: 17.8 (ref 7–25)
CALCIUM SPEC-SCNC: 8.6 MG/DL (ref 8.6–10.5)
CALCIUM SPEC-SCNC: 8.6 MG/DL (ref 8.6–10.5)
CHLORIDE SERPL-SCNC: 105 MMOL/L (ref 98–107)
CHLORIDE SERPL-SCNC: 106 MMOL/L (ref 98–107)
CK SERPL-CCNC: 2364 U/L (ref 20–200)
CK SERPL-CCNC: 2763 U/L (ref 20–200)
CO2 SERPL-SCNC: 22.8 MMOL/L (ref 22–29)
CO2 SERPL-SCNC: 23.5 MMOL/L (ref 22–29)
CREAT BLD-MCNC: 1.07 MG/DL (ref 0.76–1.27)
CREAT BLD-MCNC: 1.29 MG/DL (ref 0.76–1.27)
DEPRECATED RDW RBC AUTO: 46.4 FL (ref 37–54)
EOSINOPHIL # BLD AUTO: 0.04 10*3/MM3 (ref 0–0.4)
EOSINOPHIL NFR BLD AUTO: 0.2 % (ref 0.3–6.2)
ERYTHROCYTE [DISTWIDTH] IN BLOOD BY AUTOMATED COUNT: 12.8 % (ref 12.3–15.4)
GFR SERPL CREATININE-BSD FRML MDRD: 62 ML/MIN/1.73
GFR SERPL CREATININE-BSD FRML MDRD: 77 ML/MIN/1.73
GLOBULIN UR ELPH-MCNC: 2.6 GM/DL
GLUCOSE BLD-MCNC: 126 MG/DL (ref 65–99)
GLUCOSE BLD-MCNC: 128 MG/DL (ref 65–99)
GLUCOSE BLDC GLUCOMTR-MCNC: 125 MG/DL (ref 70–130)
HCO3 BLDA-SCNC: 23.5 MMOL/L (ref 20–26)
HCT VFR BLD AUTO: 42.2 % (ref 37.5–51)
HGB BLD-MCNC: 14.1 G/DL (ref 13–17.7)
HGB BLDA-MCNC: 14.2 G/DL (ref 14–18)
HOROWITZ INDEX BLD+IHG-RTO: 30 %
IMM GRANULOCYTES # BLD AUTO: 0.31 10*3/MM3 (ref 0–0.05)
IMM GRANULOCYTES NFR BLD AUTO: 1.9 % (ref 0–0.5)
LEFT ATRIUM VOLUME INDEX: 22 ML/M2
LEFT ATRIUM VOLUME: 42 CM3
LV EF 2D ECHO EST: 50 %
LYMPHOCYTES # BLD AUTO: 1.97 10*3/MM3 (ref 0.7–3.1)
LYMPHOCYTES NFR BLD AUTO: 12.2 % (ref 19.6–45.3)
MAGNESIUM SERPL-MCNC: 1.9 MG/DL (ref 1.6–2.6)
MAXIMAL PREDICTED HEART RATE: 180 BPM
MCH RBC QN AUTO: 32.6 PG (ref 26.6–33)
MCHC RBC AUTO-ENTMCNC: 33.4 G/DL (ref 31.5–35.7)
MCV RBC AUTO: 97.7 FL (ref 79–97)
MODALITY: ABNORMAL
MONOCYTES # BLD AUTO: 1.77 10*3/MM3 (ref 0.1–0.9)
MONOCYTES NFR BLD AUTO: 10.9 % (ref 5–12)
NEUTROPHILS # BLD AUTO: 12.05 10*3/MM3 (ref 1.7–7)
NEUTROPHILS NFR BLD AUTO: 74.4 % (ref 42.7–76)
NRBC BLD AUTO-RTO: 0 /100 WBC (ref 0–0.2)
PCO2 BLDA: 36.2 MM HG (ref 35–45)
PCO2 TEMP ADJ BLD: 36.2 MM HG (ref 35–45)
PEEP RESPIRATORY: 8 CM[H2O]
PH BLDA: 7.42 PH UNITS (ref 7.35–7.45)
PH, TEMP CORRECTED: 7.42 PH UNITS (ref 7.35–7.45)
PHOSPHATE SERPL-MCNC: 3.3 MG/DL (ref 2.5–4.5)
PLATELET # BLD AUTO: 179 10*3/MM3 (ref 140–450)
PMV BLD AUTO: 9 FL (ref 6–12)
PO2 BLDA: 79.6 MM HG (ref 83–108)
PO2 TEMP ADJ BLD: 79.6 MM HG (ref 83–108)
POTASSIUM BLD-SCNC: 4.2 MMOL/L (ref 3.5–5.2)
POTASSIUM BLD-SCNC: 4.2 MMOL/L (ref 3.5–5.2)
PROCALCITONIN SERPL-MCNC: 0.64 NG/ML (ref 0.1–0.25)
PROT SERPL-MCNC: 6.6 G/DL (ref 6–8.5)
RBC # BLD AUTO: 4.32 10*6/MM3 (ref 4.14–5.8)
SAO2 % BLDCOA: 96.3 % (ref 94–99)
SET MECH RESP RATE: 20
SODIUM BLD-SCNC: 138 MMOL/L (ref 136–145)
SODIUM BLD-SCNC: 139 MMOL/L (ref 136–145)
STRESS TARGET HR: 153 BPM
TROPONIN T SERPL-MCNC: 0.14 NG/ML (ref 0–0.03)
TROPONIN T SERPL-MCNC: 0.15 NG/ML (ref 0–0.03)
TROPONIN T SERPL-MCNC: 0.16 NG/ML (ref 0–0.03)
VENTILATOR MODE: AC
VT ON VENT VENT: 500 ML
WBC NRBC COR # BLD: 16.21 10*3/MM3 (ref 3.4–10.8)

## 2020-01-17 PROCEDURE — 25010000002 LORAZEPAM PER 2 MG: Performed by: NURSE PRACTITIONER

## 2020-01-17 PROCEDURE — 84484 ASSAY OF TROPONIN QUANT: CPT | Performed by: HOSPITALIST

## 2020-01-17 PROCEDURE — 82550 ASSAY OF CK (CPK): CPT | Performed by: NURSE PRACTITIONER

## 2020-01-17 PROCEDURE — 94799 UNLISTED PULMONARY SVC/PX: CPT

## 2020-01-17 PROCEDURE — 80053 COMPREHEN METABOLIC PANEL: CPT | Performed by: HOSPITALIST

## 2020-01-17 PROCEDURE — 25010000002 FENTANYL CITRATE (PF) 100 MCG/2ML SOLUTION: Performed by: HOSPITALIST

## 2020-01-17 PROCEDURE — 93005 ELECTROCARDIOGRAM TRACING: CPT | Performed by: NURSE PRACTITIONER

## 2020-01-17 PROCEDURE — 73120 X-RAY EXAM OF HAND: CPT

## 2020-01-17 PROCEDURE — 84484 ASSAY OF TROPONIN QUANT: CPT | Performed by: NURSE PRACTITIONER

## 2020-01-17 PROCEDURE — 25010000002 LEVETRIRACETAM PER 10 MG: Performed by: PSYCHIATRY & NEUROLOGY

## 2020-01-17 PROCEDURE — 82550 ASSAY OF CK (CPK): CPT | Performed by: HOSPITALIST

## 2020-01-17 PROCEDURE — 36600 WITHDRAWAL OF ARTERIAL BLOOD: CPT

## 2020-01-17 PROCEDURE — 94003 VENT MGMT INPAT SUBQ DAY: CPT

## 2020-01-17 PROCEDURE — 25010000002 PROPOFOL 10 MG/ML EMULSION: Performed by: NURSE PRACTITIONER

## 2020-01-17 PROCEDURE — 25010000002 PIPERACILLIN-TAZOBACTAM: Performed by: HOSPITALIST

## 2020-01-17 PROCEDURE — 93971 EXTREMITY STUDY: CPT

## 2020-01-17 PROCEDURE — 93005 ELECTROCARDIOGRAM TRACING: CPT | Performed by: HOSPITALIST

## 2020-01-17 PROCEDURE — 84100 ASSAY OF PHOSPHORUS: CPT | Performed by: HOSPITALIST

## 2020-01-17 PROCEDURE — 71045 X-RAY EXAM CHEST 1 VIEW: CPT

## 2020-01-17 PROCEDURE — 25010000002 PIPERACILLIN SOD-TAZOBACTAM PER 1 G

## 2020-01-17 PROCEDURE — 80048 BASIC METABOLIC PNL TOTAL CA: CPT | Performed by: NURSE PRACTITIONER

## 2020-01-17 PROCEDURE — 84145 PROCALCITONIN (PCT): CPT | Performed by: NURSE PRACTITIONER

## 2020-01-17 PROCEDURE — C1751 CATH, INF, PER/CENT/MIDLINE: HCPCS

## 2020-01-17 PROCEDURE — 25010000002 FOSPHENYTOIN 100 MG PE/2ML SOLUTION 2 ML VIAL: Performed by: PSYCHIATRY & NEUROLOGY

## 2020-01-17 PROCEDURE — 82962 GLUCOSE BLOOD TEST: CPT

## 2020-01-17 PROCEDURE — 93010 ELECTROCARDIOGRAM REPORT: CPT | Performed by: INTERNAL MEDICINE

## 2020-01-17 PROCEDURE — 25010000002 ENOXAPARIN PER 10 MG: Performed by: HOSPITALIST

## 2020-01-17 PROCEDURE — 25010000002 FOSPHENYTOIN 500 MG PE/10ML SOLUTION: Performed by: PSYCHIATRY & NEUROLOGY

## 2020-01-17 PROCEDURE — 93306 TTE W/DOPPLER COMPLETE: CPT

## 2020-01-17 PROCEDURE — 93306 TTE W/DOPPLER COMPLETE: CPT | Performed by: INTERNAL MEDICINE

## 2020-01-17 PROCEDURE — 99291 CRITICAL CARE FIRST HOUR: CPT | Performed by: NURSE PRACTITIONER

## 2020-01-17 PROCEDURE — 99233 SBSQ HOSP IP/OBS HIGH 50: CPT | Performed by: NURSE PRACTITIONER

## 2020-01-17 PROCEDURE — 85025 COMPLETE CBC W/AUTO DIFF WBC: CPT | Performed by: HOSPITALIST

## 2020-01-17 PROCEDURE — 83735 ASSAY OF MAGNESIUM: CPT | Performed by: HOSPITALIST

## 2020-01-17 PROCEDURE — 25010000003 LEVETIRACETAM IN NACL 0.75% 1000 MG/100ML SOLUTION: Performed by: PSYCHIATRY & NEUROLOGY

## 2020-01-17 PROCEDURE — 82803 BLOOD GASES ANY COMBINATION: CPT

## 2020-01-17 PROCEDURE — 25010000002 PIPERACILLIN SOD-TAZOBACTAM PER 1 G: Performed by: HOSPITALIST

## 2020-01-17 RX ORDER — LORAZEPAM 2 MG/ML
2 INJECTION INTRAMUSCULAR
Status: DISCONTINUED | OUTPATIENT
Start: 2020-01-17 | End: 2020-01-22

## 2020-01-17 RX ORDER — LEVETIRACETAM 10 MG/ML
1000 INJECTION INTRAVASCULAR EVERY 6 HOURS
Status: DISCONTINUED | OUTPATIENT
Start: 2020-01-17 | End: 2020-01-21

## 2020-01-17 RX ORDER — SODIUM CHLORIDE 0.9 % (FLUSH) 0.9 %
10 SYRINGE (ML) INJECTION EVERY 12 HOURS SCHEDULED
Status: DISCONTINUED | OUTPATIENT
Start: 2020-01-17 | End: 2020-01-26 | Stop reason: HOSPADM

## 2020-01-17 RX ORDER — FOSPHENYTOIN SODIUM 50 MG/ML
1000 INJECTION, SOLUTION INTRAMUSCULAR; INTRAVENOUS ONCE
Status: DISCONTINUED | OUTPATIENT
Start: 2020-01-17 | End: 2020-01-26 | Stop reason: HOSPADM

## 2020-01-17 RX ORDER — SODIUM CHLORIDE 0.9 % (FLUSH) 0.9 %
20 SYRINGE (ML) INJECTION AS NEEDED
Status: DISCONTINUED | OUTPATIENT
Start: 2020-01-17 | End: 2020-01-26 | Stop reason: HOSPADM

## 2020-01-17 RX ORDER — PIPERACILLIN SODIUM, TAZOBACTAM SODIUM 3; .375 G/15ML; G/15ML
INJECTION, POWDER, LYOPHILIZED, FOR SOLUTION INTRAVENOUS
Status: COMPLETED
Start: 2020-01-17 | End: 2020-01-17

## 2020-01-17 RX ORDER — PROPOFOL 10 MG/ML
VIAL (ML) INTRAVENOUS
Status: DISPENSED
Start: 2020-01-17 | End: 2020-01-18

## 2020-01-17 RX ORDER — LORAZEPAM 2 MG/ML
1 INJECTION INTRAMUSCULAR
Status: DISCONTINUED | OUTPATIENT
Start: 2020-01-17 | End: 2020-01-17

## 2020-01-17 RX ORDER — LORAZEPAM 2 MG/ML
2 INJECTION INTRAMUSCULAR
Status: DISCONTINUED | OUTPATIENT
Start: 2020-01-17 | End: 2020-01-17

## 2020-01-17 RX ORDER — FOSPHENYTOIN SODIUM 50 MG/ML
15 INJECTION, SOLUTION INTRAMUSCULAR; INTRAVENOUS ONCE
Status: DISCONTINUED | OUTPATIENT
Start: 2020-01-17 | End: 2020-01-17 | Stop reason: DRUGHIGH

## 2020-01-17 RX ORDER — PIPERACILLIN SODIUM, TAZOBACTAM SODIUM 3; .375 G/15ML; G/15ML
INJECTION, POWDER, LYOPHILIZED, FOR SOLUTION INTRAVENOUS
Status: DISPENSED
Start: 2020-01-17 | End: 2020-01-18

## 2020-01-17 RX ORDER — PIPERACILLIN SODIUM, TAZOBACTAM SODIUM 3; .375 G/15ML; G/15ML
INJECTION, POWDER, LYOPHILIZED, FOR SOLUTION INTRAVENOUS
Status: DISPENSED
Start: 2020-01-17 | End: 2020-01-17

## 2020-01-17 RX ORDER — SODIUM CHLORIDE 0.9 % (FLUSH) 0.9 %
10 SYRINGE (ML) INJECTION AS NEEDED
Status: DISCONTINUED | OUTPATIENT
Start: 2020-01-17 | End: 2020-01-26 | Stop reason: HOSPADM

## 2020-01-17 RX ADMIN — SODIUM CHLORIDE 150 MG PE: 9 INJECTION, SOLUTION INTRAVENOUS at 22:29

## 2020-01-17 RX ADMIN — LORAZEPAM 2 MG: 2 INJECTION INTRAMUSCULAR; INTRAVENOUS at 15:44

## 2020-01-17 RX ADMIN — FENTANYL CITRATE 25 MCG: 50 INJECTION INTRAMUSCULAR; INTRAVENOUS at 12:33

## 2020-01-17 RX ADMIN — LEVETIRACETAM 1000 MG: 10 INJECTION, SOLUTION INTRAVENOUS at 21:18

## 2020-01-17 RX ADMIN — LORAZEPAM 1 MG: 2 INJECTION INTRAMUSCULAR; INTRAVENOUS at 13:09

## 2020-01-17 RX ADMIN — FENTANYL CITRATE 25 MCG: 50 INJECTION INTRAMUSCULAR; INTRAVENOUS at 14:21

## 2020-01-17 RX ADMIN — SODIUM CHLORIDE, PRESERVATIVE FREE 10 ML: 5 INJECTION INTRAVENOUS at 20:17

## 2020-01-17 RX ADMIN — FENTANYL CITRATE 25 MCG: 50 INJECTION INTRAMUSCULAR; INTRAVENOUS at 08:01

## 2020-01-17 RX ADMIN — PROPOFOL 10 MCG/KG/MIN: 10 INJECTION, EMULSION INTRAVENOUS at 15:28

## 2020-01-17 RX ADMIN — SODIUM CHLORIDE, POTASSIUM CHLORIDE, SODIUM LACTATE AND CALCIUM CHLORIDE 150 ML/HR: 600; 310; 30; 20 INJECTION, SOLUTION INTRAVENOUS at 21:11

## 2020-01-17 RX ADMIN — FOSPHENYTOIN SODIUM 1197 MG PE: 50 INJECTION INTRAMUSCULAR; INTRAVENOUS at 15:25

## 2020-01-17 RX ADMIN — PIPERACILLIN SODIUM AND TAZOBACTAM SODIUM 3375 MG: 3; .375 INJECTION, POWDER, LYOPHILIZED, FOR SOLUTION INTRAVENOUS at 05:28

## 2020-01-17 RX ADMIN — SODIUM CHLORIDE, PRESERVATIVE FREE 10 ML: 5 INJECTION INTRAVENOUS at 08:01

## 2020-01-17 RX ADMIN — LORAZEPAM 2 MG: 2 INJECTION INTRAMUSCULAR; INTRAVENOUS at 21:07

## 2020-01-17 RX ADMIN — ENOXAPARIN SODIUM 40 MG: 40 INJECTION SUBCUTANEOUS at 23:12

## 2020-01-17 RX ADMIN — PIPERACILLIN SODIUM AND TAZOBACTAM SODIUM 3.38 G: 3; .375 INJECTION, POWDER, LYOPHILIZED, FOR SOLUTION INTRAVENOUS at 23:11

## 2020-01-17 RX ADMIN — LEVETIRACETAM 1500 MG: 100 INJECTION, SOLUTION INTRAVENOUS at 15:27

## 2020-01-17 RX ADMIN — SODIUM CHLORIDE, PRESERVATIVE FREE 10 ML: 5 INJECTION INTRAVENOUS at 20:15

## 2020-01-17 RX ADMIN — SODIUM CHLORIDE, PRESERVATIVE FREE 10 ML: 5 INJECTION INTRAVENOUS at 20:16

## 2020-01-17 RX ADMIN — FENTANYL CITRATE 25 MCG: 50 INJECTION INTRAMUSCULAR; INTRAVENOUS at 20:12

## 2020-01-17 RX ADMIN — PIPERACILLIN SODIUM AND TAZOBACTAM SODIUM 3.38 G: 3; .375 INJECTION, POWDER, LYOPHILIZED, FOR SOLUTION INTRAVENOUS at 11:20

## 2020-01-17 RX ADMIN — SODIUM CHLORIDE, POTASSIUM CHLORIDE, SODIUM LACTATE AND CALCIUM CHLORIDE 150 ML/HR: 600; 310; 30; 20 INJECTION, SOLUTION INTRAVENOUS at 14:16

## 2020-01-17 RX ADMIN — PIPERACILLIN SODIUM AND TAZOBACTAM SODIUM 3.38 G: 3; .375 INJECTION, POWDER, LYOPHILIZED, FOR SOLUTION INTRAVENOUS at 17:30

## 2020-01-17 RX ADMIN — PROPOFOL 40 MCG/KG/MIN: 10 INJECTION, EMULSION INTRAVENOUS at 23:37

## 2020-01-17 RX ADMIN — FENTANYL CITRATE 25 MCG: 50 INJECTION INTRAMUSCULAR; INTRAVENOUS at 11:30

## 2020-01-18 ENCOUNTER — APPOINTMENT (OUTPATIENT)
Dept: GENERAL RADIOLOGY | Facility: HOSPITAL | Age: 40
End: 2020-01-18

## 2020-01-18 ENCOUNTER — APPOINTMENT (OUTPATIENT)
Dept: PULMONOLOGY | Facility: HOSPITAL | Age: 40
End: 2020-01-18

## 2020-01-18 LAB
ANION GAP SERPL CALCULATED.3IONS-SCNC: 10.6 MMOL/L (ref 5–15)
ARTERIAL PATENCY WRIST A: POSITIVE
ATMOSPHERIC PRESS: 737 MMHG
BASE EXCESS BLDA CALC-SCNC: 6.8 MMOL/L (ref 0–2)
BASOPHILS # BLD AUTO: 0.04 10*3/MM3 (ref 0–0.2)
BASOPHILS NFR BLD AUTO: 0.3 % (ref 0–1.5)
BDY SITE: ABNORMAL
BODY TEMPERATURE: 37 C
BUN BLD-MCNC: 9 MG/DL (ref 6–20)
BUN/CREAT SERPL: 12 (ref 7–25)
CALCIUM SPEC-SCNC: 8.4 MG/DL (ref 8.6–10.5)
CHLORIDE SERPL-SCNC: 101 MMOL/L (ref 98–107)
CK SERPL-CCNC: 1517 U/L (ref 20–200)
CO2 SERPL-SCNC: 27.4 MMOL/L (ref 22–29)
CREAT BLD-MCNC: 0.75 MG/DL (ref 0.76–1.27)
DEPRECATED RDW RBC AUTO: 45.4 FL (ref 37–54)
EOSINOPHIL # BLD AUTO: 0.01 10*3/MM3 (ref 0–0.4)
EOSINOPHIL NFR BLD AUTO: 0.1 % (ref 0.3–6.2)
ERYTHROCYTE [DISTWIDTH] IN BLOOD BY AUTOMATED COUNT: 12.8 % (ref 12.3–15.4)
GFR SERPL CREATININE-BSD FRML MDRD: 115 ML/MIN/1.73
GLUCOSE BLD-MCNC: 119 MG/DL (ref 65–99)
HCO3 BLDA-SCNC: 29.5 MMOL/L (ref 20–26)
HCT VFR BLD AUTO: 38.7 % (ref 37.5–51)
HGB BLD-MCNC: 13 G/DL (ref 13–17.7)
HGB BLDA-MCNC: 12.6 G/DL (ref 14–18)
HOROWITZ INDEX BLD+IHG-RTO: 45 %
IMM GRANULOCYTES # BLD AUTO: 0.17 10*3/MM3 (ref 0–0.05)
IMM GRANULOCYTES NFR BLD AUTO: 1.1 % (ref 0–0.5)
LYMPHOCYTES # BLD AUTO: 1.59 10*3/MM3 (ref 0.7–3.1)
LYMPHOCYTES NFR BLD AUTO: 10.3 % (ref 19.6–45.3)
MCH RBC QN AUTO: 32.3 PG (ref 26.6–33)
MCHC RBC AUTO-ENTMCNC: 33.6 G/DL (ref 31.5–35.7)
MCV RBC AUTO: 96.3 FL (ref 79–97)
MODALITY: ABNORMAL
MONOCYTES # BLD AUTO: 1.37 10*3/MM3 (ref 0.1–0.9)
MONOCYTES NFR BLD AUTO: 8.9 % (ref 5–12)
NEUTROPHILS # BLD AUTO: 12.3 10*3/MM3 (ref 1.7–7)
NEUTROPHILS NFR BLD AUTO: 79.3 % (ref 42.7–76)
NRBC BLD AUTO-RTO: 0 /100 WBC (ref 0–0.2)
PCO2 BLDA: 34.5 MM HG (ref 35–45)
PCO2 TEMP ADJ BLD: 34.5 MM HG (ref 35–45)
PEEP RESPIRATORY: 5 CM[H2O]
PH BLDA: 7.54 PH UNITS (ref 7.35–7.45)
PH, TEMP CORRECTED: 7.54 PH UNITS (ref 7.35–7.45)
PLATELET # BLD AUTO: 157 10*3/MM3 (ref 140–450)
PMV BLD AUTO: 9.3 FL (ref 6–12)
PO2 BLDA: 68.6 MM HG (ref 83–108)
PO2 TEMP ADJ BLD: 68.6 MM HG (ref 83–108)
POTASSIUM BLD-SCNC: 3.8 MMOL/L (ref 3.5–5.2)
PROCALCITONIN SERPL-MCNC: 0.39 NG/ML (ref 0.1–0.25)
PSV: 10 CMH2O
RBC # BLD AUTO: 4.02 10*6/MM3 (ref 4.14–5.8)
SAO2 % BLDCOA: 96 % (ref 94–99)
SET MECH RESP RATE: 36
SODIUM BLD-SCNC: 139 MMOL/L (ref 136–145)
TROPONIN T SERPL-MCNC: 0.21 NG/ML (ref 0–0.03)
VENTILATOR MODE: ABNORMAL
WBC NRBC COR # BLD: 15.48 10*3/MM3 (ref 3.4–10.8)

## 2020-01-18 PROCEDURE — 85025 COMPLETE CBC W/AUTO DIFF WBC: CPT | Performed by: NURSE PRACTITIONER

## 2020-01-18 PROCEDURE — 25010000002 VANCOMYCIN PER 500 MG: Performed by: INTERNAL MEDICINE

## 2020-01-18 PROCEDURE — 25010000002 VANCOMYCIN 1 G RECONSTITUTED SOLUTION 1 EACH VIAL: Performed by: INTERNAL MEDICINE

## 2020-01-18 PROCEDURE — 84145 PROCALCITONIN (PCT): CPT | Performed by: NURSE PRACTITIONER

## 2020-01-18 PROCEDURE — 87205 SMEAR GRAM STAIN: CPT | Performed by: INTERNAL MEDICINE

## 2020-01-18 PROCEDURE — 94003 VENT MGMT INPAT SUBQ DAY: CPT

## 2020-01-18 PROCEDURE — 25010000002 FOSPHENYTOIN 100 MG PE/2ML SOLUTION 2 ML VIAL: Performed by: PSYCHIATRY & NEUROLOGY

## 2020-01-18 PROCEDURE — 25010000002 LORAZEPAM PER 2 MG: Performed by: NURSE PRACTITIONER

## 2020-01-18 PROCEDURE — 94799 UNLISTED PULMONARY SVC/PX: CPT

## 2020-01-18 PROCEDURE — 71045 X-RAY EXAM CHEST 1 VIEW: CPT

## 2020-01-18 PROCEDURE — 82550 ASSAY OF CK (CPK): CPT | Performed by: NURSE PRACTITIONER

## 2020-01-18 PROCEDURE — 99232 SBSQ HOSP IP/OBS MODERATE 35: CPT | Performed by: HOSPITALIST

## 2020-01-18 PROCEDURE — 99254 IP/OBS CNSLTJ NEW/EST MOD 60: CPT | Performed by: INTERNAL MEDICINE

## 2020-01-18 PROCEDURE — 82803 BLOOD GASES ANY COMBINATION: CPT

## 2020-01-18 PROCEDURE — 25010000002 ENOXAPARIN PER 10 MG: Performed by: HOSPITALIST

## 2020-01-18 PROCEDURE — 25010000003 LEVETIRACETAM IN NACL 0.75% 1000 MG/100ML SOLUTION: Performed by: PSYCHIATRY & NEUROLOGY

## 2020-01-18 PROCEDURE — 80048 BASIC METABOLIC PNL TOTAL CA: CPT | Performed by: NURSE PRACTITIONER

## 2020-01-18 PROCEDURE — 36600 WITHDRAWAL OF ARTERIAL BLOOD: CPT

## 2020-01-18 PROCEDURE — 25010000002 FENTANYL CITRATE (PF) 100 MCG/2ML SOLUTION: Performed by: HOSPITALIST

## 2020-01-18 PROCEDURE — 25010000002 PIPERACILLIN-TAZOBACTAM: Performed by: HOSPITALIST

## 2020-01-18 PROCEDURE — 93005 ELECTROCARDIOGRAM TRACING: CPT | Performed by: NURSE PRACTITIONER

## 2020-01-18 PROCEDURE — 95819 EEG AWAKE AND ASLEEP: CPT

## 2020-01-18 PROCEDURE — 84484 ASSAY OF TROPONIN QUANT: CPT | Performed by: NURSE PRACTITIONER

## 2020-01-18 PROCEDURE — 25010000002 PIPERACILLIN SOD-TAZOBACTAM PER 1 G: Performed by: HOSPITALIST

## 2020-01-18 PROCEDURE — 25010000002 PROPOFOL 10 MG/ML EMULSION: Performed by: NURSE PRACTITIONER

## 2020-01-18 PROCEDURE — 93010 ELECTROCARDIOGRAM REPORT: CPT | Performed by: INTERNAL MEDICINE

## 2020-01-18 PROCEDURE — 87070 CULTURE OTHR SPECIMN AEROBIC: CPT | Performed by: INTERNAL MEDICINE

## 2020-01-18 RX ORDER — ACETAMINOPHEN 325 MG/1
650 TABLET ORAL EVERY 6 HOURS PRN
Status: DISCONTINUED | OUTPATIENT
Start: 2020-01-18 | End: 2020-01-18

## 2020-01-18 RX ORDER — ACETAMINOPHEN 325 MG/1
650 TABLET ORAL EVERY 4 HOURS PRN
Status: DISCONTINUED | OUTPATIENT
Start: 2020-01-18 | End: 2020-01-26 | Stop reason: HOSPADM

## 2020-01-18 RX ORDER — ACETAMINOPHEN 650 MG/1
650 SUPPOSITORY RECTAL EVERY 6 HOURS PRN
Status: DISCONTINUED | OUTPATIENT
Start: 2020-01-18 | End: 2020-01-18

## 2020-01-18 RX ORDER — DEXMEDETOMIDINE HYDROCHLORIDE 4 UG/ML
.2-1.5 INJECTION, SOLUTION INTRAVENOUS CONTINUOUS
Status: DISCONTINUED | OUTPATIENT
Start: 2020-01-18 | End: 2020-01-21

## 2020-01-18 RX ORDER — ACETAMINOPHEN 650 MG/1
650 SUPPOSITORY RECTAL EVERY 4 HOURS PRN
Status: DISCONTINUED | OUTPATIENT
Start: 2020-01-18 | End: 2020-01-26 | Stop reason: HOSPADM

## 2020-01-18 RX ORDER — FAMOTIDINE 10 MG/ML
20 INJECTION, SOLUTION INTRAVENOUS DAILY
Status: DISCONTINUED | OUTPATIENT
Start: 2020-01-18 | End: 2020-01-22

## 2020-01-18 RX ORDER — PIPERACILLIN SODIUM, TAZOBACTAM SODIUM 3; .375 G/15ML; G/15ML
INJECTION, POWDER, LYOPHILIZED, FOR SOLUTION INTRAVENOUS
Status: DISPENSED
Start: 2020-01-18 | End: 2020-01-18

## 2020-01-18 RX ADMIN — PROPOFOL 40 MCG/KG/MIN: 10 INJECTION, EMULSION INTRAVENOUS at 04:00

## 2020-01-18 RX ADMIN — VANCOMYCIN HYDROCHLORIDE 1500 MG: 1 INJECTION, POWDER, LYOPHILIZED, FOR SOLUTION INTRAVENOUS at 16:42

## 2020-01-18 RX ADMIN — SODIUM CHLORIDE, PRESERVATIVE FREE 10 ML: 5 INJECTION INTRAVENOUS at 08:12

## 2020-01-18 RX ADMIN — SODIUM CHLORIDE 150 MG PE: 9 INJECTION, SOLUTION INTRAVENOUS at 05:53

## 2020-01-18 RX ADMIN — FENTANYL CITRATE 25 MCG: 50 INJECTION INTRAMUSCULAR; INTRAVENOUS at 23:17

## 2020-01-18 RX ADMIN — PIPERACILLIN SODIUM AND TAZOBACTAM SODIUM 3.38 G: 3; .375 INJECTION, POWDER, LYOPHILIZED, FOR SOLUTION INTRAVENOUS at 23:09

## 2020-01-18 RX ADMIN — FENTANYL CITRATE 25 MCG: 50 INJECTION INTRAMUSCULAR; INTRAVENOUS at 06:08

## 2020-01-18 RX ADMIN — SODIUM CHLORIDE, PRESERVATIVE FREE 10 ML: 5 INJECTION INTRAVENOUS at 08:14

## 2020-01-18 RX ADMIN — FENTANYL CITRATE 25 MCG: 50 INJECTION INTRAMUSCULAR; INTRAVENOUS at 10:21

## 2020-01-18 RX ADMIN — SODIUM CHLORIDE, POTASSIUM CHLORIDE, SODIUM LACTATE AND CALCIUM CHLORIDE 150 ML/HR: 600; 310; 30; 20 INJECTION, SOLUTION INTRAVENOUS at 10:21

## 2020-01-18 RX ADMIN — SODIUM CHLORIDE 150 MG PE: 9 INJECTION, SOLUTION INTRAVENOUS at 21:37

## 2020-01-18 RX ADMIN — SODIUM CHLORIDE, PRESERVATIVE FREE 10 ML: 5 INJECTION INTRAVENOUS at 08:15

## 2020-01-18 RX ADMIN — SODIUM CHLORIDE, POTASSIUM CHLORIDE, SODIUM LACTATE AND CALCIUM CHLORIDE 150 ML/HR: 600; 310; 30; 20 INJECTION, SOLUTION INTRAVENOUS at 03:52

## 2020-01-18 RX ADMIN — PROPOFOL 25 MCG/KG/MIN: 10 INJECTION, EMULSION INTRAVENOUS at 15:05

## 2020-01-18 RX ADMIN — PROPOFOL 50 MCG/KG/MIN: 10 INJECTION, EMULSION INTRAVENOUS at 08:09

## 2020-01-18 RX ADMIN — ENOXAPARIN SODIUM 40 MG: 40 INJECTION SUBCUTANEOUS at 23:10

## 2020-01-18 RX ADMIN — LEVETIRACETAM 1000 MG: 10 INJECTION, SOLUTION INTRAVENOUS at 21:36

## 2020-01-18 RX ADMIN — SODIUM CHLORIDE, POTASSIUM CHLORIDE, SODIUM LACTATE AND CALCIUM CHLORIDE 150 ML/HR: 600; 310; 30; 20 INJECTION, SOLUTION INTRAVENOUS at 23:49

## 2020-01-18 RX ADMIN — SODIUM CHLORIDE, PRESERVATIVE FREE 10 ML: 5 INJECTION INTRAVENOUS at 20:41

## 2020-01-18 RX ADMIN — DEXMEDETOMIDINE HYDROCHLORIDE 0.7 MCG/KG/HR: 400 INJECTION INTRAVENOUS at 15:00

## 2020-01-18 RX ADMIN — FENTANYL CITRATE 25 MCG: 50 INJECTION INTRAMUSCULAR; INTRAVENOUS at 11:55

## 2020-01-18 RX ADMIN — DEXMEDETOMIDINE HYDROCHLORIDE 1 MCG/KG/HR: 400 INJECTION INTRAVENOUS at 21:50

## 2020-01-18 RX ADMIN — LORAZEPAM 2 MG: 2 INJECTION INTRAMUSCULAR; INTRAVENOUS at 12:35

## 2020-01-18 RX ADMIN — ACETAMINOPHEN 650 MG: 650 SUPPOSITORY RECTAL at 13:26

## 2020-01-18 RX ADMIN — PROPOFOL 30 MCG/KG/MIN: 10 INJECTION, EMULSION INTRAVENOUS at 20:39

## 2020-01-18 RX ADMIN — FENTANYL CITRATE 25 MCG: 50 INJECTION INTRAMUSCULAR; INTRAVENOUS at 00:17

## 2020-01-18 RX ADMIN — DEXMEDETOMIDINE HYDROCHLORIDE 0.2 MCG/KG/HR: 400 INJECTION INTRAVENOUS at 08:08

## 2020-01-18 RX ADMIN — PIPERACILLIN SODIUM AND TAZOBACTAM SODIUM 3.38 G: 3; .375 INJECTION, POWDER, LYOPHILIZED, FOR SOLUTION INTRAVENOUS at 05:27

## 2020-01-18 RX ADMIN — LEVETIRACETAM 1000 MG: 10 INJECTION, SOLUTION INTRAVENOUS at 09:56

## 2020-01-18 RX ADMIN — PIPERACILLIN SODIUM AND TAZOBACTAM SODIUM 3.38 G: 3; .375 INJECTION, POWDER, LYOPHILIZED, FOR SOLUTION INTRAVENOUS at 11:08

## 2020-01-18 RX ADMIN — SODIUM CHLORIDE, POTASSIUM CHLORIDE, SODIUM LACTATE AND CALCIUM CHLORIDE 150 ML/HR: 600; 310; 30; 20 INJECTION, SOLUTION INTRAVENOUS at 16:45

## 2020-01-18 RX ADMIN — SODIUM CHLORIDE, PRESERVATIVE FREE 10 ML: 5 INJECTION INTRAVENOUS at 20:40

## 2020-01-18 RX ADMIN — FENTANYL CITRATE 25 MCG: 50 INJECTION INTRAMUSCULAR; INTRAVENOUS at 04:03

## 2020-01-18 RX ADMIN — LEVETIRACETAM 1000 MG: 10 INJECTION, SOLUTION INTRAVENOUS at 03:50

## 2020-01-18 RX ADMIN — LEVETIRACETAM 1000 MG: 10 INJECTION, SOLUTION INTRAVENOUS at 15:00

## 2020-01-18 RX ADMIN — FENTANYL CITRATE 25 MCG: 50 INJECTION INTRAMUSCULAR; INTRAVENOUS at 19:59

## 2020-01-18 RX ADMIN — PIPERACILLIN SODIUM AND TAZOBACTAM SODIUM 3.38 G: 3; .375 INJECTION, POWDER, LYOPHILIZED, FOR SOLUTION INTRAVENOUS at 18:06

## 2020-01-18 RX ADMIN — FAMOTIDINE 20 MG: 10 INJECTION, SOLUTION INTRAVENOUS at 11:08

## 2020-01-18 RX ADMIN — SODIUM CHLORIDE 150 MG PE: 9 INJECTION, SOLUTION INTRAVENOUS at 13:27

## 2020-01-19 ENCOUNTER — APPOINTMENT (OUTPATIENT)
Dept: GENERAL RADIOLOGY | Facility: HOSPITAL | Age: 40
End: 2020-01-19

## 2020-01-19 LAB
ALBUMIN SERPL-MCNC: 3 G/DL (ref 3.5–5.2)
ALP SERPL-CCNC: 52 U/L (ref 39–117)
ALT SERPL W P-5'-P-CCNC: 32 U/L (ref 1–41)
ANION GAP SERPL CALCULATED.3IONS-SCNC: 9.7 MMOL/L (ref 5–15)
ARTERIAL PATENCY WRIST A: POSITIVE
AST SERPL-CCNC: 40 U/L (ref 1–40)
ATMOSPHERIC PRESS: 742 MMHG
BASE EXCESS BLDA CALC-SCNC: 4.7 MMOL/L (ref 0–2)
BASOPHILS # BLD AUTO: 0.04 10*3/MM3 (ref 0–0.2)
BASOPHILS NFR BLD AUTO: 0.3 % (ref 0–1.5)
BDY SITE: ABNORMAL
BILIRUB CONJ SERPL-MCNC: 0.4 MG/DL (ref 0.2–0.3)
BILIRUB INDIRECT SERPL-MCNC: 0.5 MG/DL
BILIRUB SERPL-MCNC: 0.9 MG/DL (ref 0.2–1.2)
BODY TEMPERATURE: 37 C
BUN BLD-MCNC: 9 MG/DL (ref 6–20)
BUN/CREAT SERPL: 12.2 (ref 7–25)
CALCIUM SPEC-SCNC: 8.3 MG/DL (ref 8.6–10.5)
CHLORIDE SERPL-SCNC: 103 MMOL/L (ref 98–107)
CK SERPL-CCNC: 746 U/L (ref 20–200)
CO2 SERPL-SCNC: 26.3 MMOL/L (ref 22–29)
CREAT BLD-MCNC: 0.74 MG/DL (ref 0.76–1.27)
DEPRECATED RDW RBC AUTO: 45 FL (ref 37–54)
EOSINOPHIL # BLD AUTO: 0.01 10*3/MM3 (ref 0–0.4)
EOSINOPHIL NFR BLD AUTO: 0.1 % (ref 0.3–6.2)
ERYTHROCYTE [DISTWIDTH] IN BLOOD BY AUTOMATED COUNT: 12.7 % (ref 12.3–15.4)
GFR SERPL CREATININE-BSD FRML MDRD: 117 ML/MIN/1.73
GLUCOSE BLD-MCNC: 124 MG/DL (ref 65–99)
HCO3 BLDA-SCNC: 27.4 MMOL/L (ref 20–26)
HCT VFR BLD AUTO: 33.5 % (ref 37.5–51)
HGB BLD-MCNC: 11.3 G/DL (ref 13–17.7)
HGB BLDA-MCNC: 12.1 G/DL (ref 14–18)
HOROWITZ INDEX BLD+IHG-RTO: 35 %
IMM GRANULOCYTES # BLD AUTO: 0.09 10*3/MM3 (ref 0–0.05)
IMM GRANULOCYTES NFR BLD AUTO: 0.7 % (ref 0–0.5)
LYMPHOCYTES # BLD AUTO: 1.47 10*3/MM3 (ref 0.7–3.1)
LYMPHOCYTES NFR BLD AUTO: 10.8 % (ref 19.6–45.3)
MCH RBC QN AUTO: 32.6 PG (ref 26.6–33)
MCHC RBC AUTO-ENTMCNC: 33.7 G/DL (ref 31.5–35.7)
MCV RBC AUTO: 96.5 FL (ref 79–97)
MODALITY: ABNORMAL
MONOCYTES # BLD AUTO: 1.22 10*3/MM3 (ref 0.1–0.9)
MONOCYTES NFR BLD AUTO: 9 % (ref 5–12)
NEUTROPHILS # BLD AUTO: 10.78 10*3/MM3 (ref 1.7–7)
NEUTROPHILS NFR BLD AUTO: 79.1 % (ref 42.7–76)
NRBC BLD AUTO-RTO: 0 /100 WBC (ref 0–0.2)
PCO2 BLDA: 33.6 MM HG (ref 35–45)
PCO2 TEMP ADJ BLD: 33.6 MM HG (ref 35–45)
PH BLDA: 7.52 PH UNITS (ref 7.35–7.45)
PH, TEMP CORRECTED: 7.52 PH UNITS (ref 7.35–7.45)
PLATELET # BLD AUTO: 113 10*3/MM3 (ref 140–450)
PMV BLD AUTO: 9.4 FL (ref 6–12)
PO2 BLDA: 74 MM HG (ref 83–108)
PO2 TEMP ADJ BLD: 74 MM HG (ref 83–108)
POTASSIUM BLD-SCNC: 3.6 MMOL/L (ref 3.5–5.2)
PROT SERPL-MCNC: 5.6 G/DL (ref 6–8.5)
RBC # BLD AUTO: 3.47 10*6/MM3 (ref 4.14–5.8)
SAO2 % BLDCOA: 96.5 % (ref 94–99)
SODIUM BLD-SCNC: 139 MMOL/L (ref 136–145)
VANCOMYCIN SERPL-MCNC: 14.1 MCG/ML (ref 5–40)
VENTILATOR MODE: ABNORMAL
WBC NRBC COR # BLD: 13.61 10*3/MM3 (ref 3.4–10.8)

## 2020-01-19 PROCEDURE — 71045 X-RAY EXAM CHEST 1 VIEW: CPT

## 2020-01-19 PROCEDURE — 36600 WITHDRAWAL OF ARTERIAL BLOOD: CPT

## 2020-01-19 PROCEDURE — 25010000002 PIPERACILLIN-TAZOBACTAM: Performed by: HOSPITALIST

## 2020-01-19 PROCEDURE — 94799 UNLISTED PULMONARY SVC/PX: CPT

## 2020-01-19 PROCEDURE — 25010000002 FENTANYL CITRATE (PF) 100 MCG/2ML SOLUTION: Performed by: HOSPITALIST

## 2020-01-19 PROCEDURE — 25010000002 PROPOFOL 10 MG/ML EMULSION: Performed by: NURSE PRACTITIONER

## 2020-01-19 PROCEDURE — 25010000003 LEVETIRACETAM IN NACL 0.75% 1000 MG/100ML SOLUTION: Performed by: PSYCHIATRY & NEUROLOGY

## 2020-01-19 PROCEDURE — 93010 ELECTROCARDIOGRAM REPORT: CPT | Performed by: INTERNAL MEDICINE

## 2020-01-19 PROCEDURE — 25010000002 VANCOMYCIN PER 500 MG: Performed by: INTERNAL MEDICINE

## 2020-01-19 PROCEDURE — 80202 ASSAY OF VANCOMYCIN: CPT | Performed by: INTERNAL MEDICINE

## 2020-01-19 PROCEDURE — 94003 VENT MGMT INPAT SUBQ DAY: CPT

## 2020-01-19 PROCEDURE — 93005 ELECTROCARDIOGRAM TRACING: CPT | Performed by: NURSE PRACTITIONER

## 2020-01-19 PROCEDURE — 25010000002 FOSPHENYTOIN 100 MG PE/2ML SOLUTION 2 ML VIAL: Performed by: PSYCHIATRY & NEUROLOGY

## 2020-01-19 PROCEDURE — 82550 ASSAY OF CK (CPK): CPT | Performed by: NURSE PRACTITIONER

## 2020-01-19 PROCEDURE — 87040 BLOOD CULTURE FOR BACTERIA: CPT | Performed by: HOSPITALIST

## 2020-01-19 PROCEDURE — 80076 HEPATIC FUNCTION PANEL: CPT | Performed by: INTERNAL MEDICINE

## 2020-01-19 PROCEDURE — 25010000002 LORAZEPAM PER 2 MG: Performed by: NURSE PRACTITIONER

## 2020-01-19 PROCEDURE — 99232 SBSQ HOSP IP/OBS MODERATE 35: CPT | Performed by: HOSPITALIST

## 2020-01-19 PROCEDURE — 85025 COMPLETE CBC W/AUTO DIFF WBC: CPT | Performed by: NURSE PRACTITIONER

## 2020-01-19 PROCEDURE — 80048 BASIC METABOLIC PNL TOTAL CA: CPT | Performed by: NURSE PRACTITIONER

## 2020-01-19 PROCEDURE — 82803 BLOOD GASES ANY COMBINATION: CPT

## 2020-01-19 PROCEDURE — 25010000002 VANCOMYCIN 750 MG RECONSTITUTED SOLUTION 1 EACH VIAL: Performed by: INTERNAL MEDICINE

## 2020-01-19 PROCEDURE — 25010000002 VANCOMYCIN 1 G RECONSTITUTED SOLUTION 1 EACH VIAL: Performed by: INTERNAL MEDICINE

## 2020-01-19 PROCEDURE — 25010000002 ENOXAPARIN PER 10 MG: Performed by: HOSPITALIST

## 2020-01-19 PROCEDURE — 74018 RADEX ABDOMEN 1 VIEW: CPT

## 2020-01-19 RX ORDER — PROPOFOL 10 MG/ML
VIAL (ML) INTRAVENOUS
Status: DISPENSED
Start: 2020-01-19 | End: 2020-01-20

## 2020-01-19 RX ORDER — DEXMEDETOMIDINE HYDROCHLORIDE 4 UG/ML
INJECTION, SOLUTION INTRAVENOUS
Status: DISPENSED
Start: 2020-01-19 | End: 2020-01-20

## 2020-01-19 RX ADMIN — PIPERACILLIN SODIUM AND TAZOBACTAM SODIUM 3.38 G: 3; .375 INJECTION, POWDER, LYOPHILIZED, FOR SOLUTION INTRAVENOUS at 18:11

## 2020-01-19 RX ADMIN — SODIUM CHLORIDE, PRESERVATIVE FREE 10 ML: 5 INJECTION INTRAVENOUS at 09:41

## 2020-01-19 RX ADMIN — PROPOFOL 50 MCG/KG/MIN: 10 INJECTION, EMULSION INTRAVENOUS at 12:16

## 2020-01-19 RX ADMIN — FENTANYL CITRATE 25 MCG: 50 INJECTION INTRAMUSCULAR; INTRAVENOUS at 07:19

## 2020-01-19 RX ADMIN — SODIUM CHLORIDE, PRESERVATIVE FREE 10 ML: 5 INJECTION INTRAVENOUS at 20:14

## 2020-01-19 RX ADMIN — SODIUM CHLORIDE, POTASSIUM CHLORIDE, SODIUM LACTATE AND CALCIUM CHLORIDE 150 ML/HR: 600; 310; 30; 20 INJECTION, SOLUTION INTRAVENOUS at 07:20

## 2020-01-19 RX ADMIN — LEVETIRACETAM 1000 MG: 10 INJECTION, SOLUTION INTRAVENOUS at 09:37

## 2020-01-19 RX ADMIN — FENTANYL CITRATE 25 MCG: 50 INJECTION INTRAMUSCULAR; INTRAVENOUS at 23:36

## 2020-01-19 RX ADMIN — PROPOFOL 50 MCG/KG/MIN: 10 INJECTION, EMULSION INTRAVENOUS at 19:19

## 2020-01-19 RX ADMIN — VANCOMYCIN HYDROCHLORIDE 1250 MG: 500 INJECTION, POWDER, LYOPHILIZED, FOR SOLUTION INTRAVENOUS at 03:29

## 2020-01-19 RX ADMIN — ACETAMINOPHEN 650 MG: 650 SUPPOSITORY RECTAL at 08:04

## 2020-01-19 RX ADMIN — DEXMEDETOMIDINE HYDROCHLORIDE 1.4 MCG/KG/HR: 400 INJECTION INTRAVENOUS at 05:32

## 2020-01-19 RX ADMIN — DEXMEDETOMIDINE HYDROCHLORIDE 1.2 MCG/KG/HR: 400 INJECTION INTRAVENOUS at 13:30

## 2020-01-19 RX ADMIN — PROPOFOL 50 MCG/KG/MIN: 10 INJECTION, EMULSION INTRAVENOUS at 16:04

## 2020-01-19 RX ADMIN — PIPERACILLIN SODIUM AND TAZOBACTAM SODIUM 3.38 G: 3; .375 INJECTION, POWDER, LYOPHILIZED, FOR SOLUTION INTRAVENOUS at 05:19

## 2020-01-19 RX ADMIN — SODIUM CHLORIDE, PRESERVATIVE FREE 10 ML: 5 INJECTION INTRAVENOUS at 09:42

## 2020-01-19 RX ADMIN — DEXMEDETOMIDINE HYDROCHLORIDE 1.2 MCG/KG/HR: 400 INJECTION INTRAVENOUS at 09:37

## 2020-01-19 RX ADMIN — DEXMEDETOMIDINE HYDROCHLORIDE 1.2 MCG/KG/HR: 400 INJECTION INTRAVENOUS at 22:09

## 2020-01-19 RX ADMIN — DEXMEDETOMIDINE HYDROCHLORIDE 1.2 MCG/KG/HR: 400 INJECTION INTRAVENOUS at 17:48

## 2020-01-19 RX ADMIN — LORAZEPAM 2 MG: 2 INJECTION INTRAMUSCULAR; INTRAVENOUS at 07:32

## 2020-01-19 RX ADMIN — PIPERACILLIN SODIUM AND TAZOBACTAM SODIUM 3.38 G: 3; .375 INJECTION, POWDER, LYOPHILIZED, FOR SOLUTION INTRAVENOUS at 11:02

## 2020-01-19 RX ADMIN — FAMOTIDINE 20 MG: 10 INJECTION, SOLUTION INTRAVENOUS at 08:04

## 2020-01-19 RX ADMIN — VANCOMYCIN HYDROCHLORIDE 1250 MG: 500 INJECTION, POWDER, LYOPHILIZED, FOR SOLUTION INTRAVENOUS at 13:23

## 2020-01-19 RX ADMIN — FENTANYL CITRATE 25 MCG: 50 INJECTION INTRAMUSCULAR; INTRAVENOUS at 02:06

## 2020-01-19 RX ADMIN — SODIUM CHLORIDE 150 MG PE: 9 INJECTION, SOLUTION INTRAVENOUS at 14:52

## 2020-01-19 RX ADMIN — LEVETIRACETAM 1000 MG: 10 INJECTION, SOLUTION INTRAVENOUS at 02:51

## 2020-01-19 RX ADMIN — SODIUM CHLORIDE, PRESERVATIVE FREE 10 ML: 5 INJECTION INTRAVENOUS at 20:16

## 2020-01-19 RX ADMIN — LORAZEPAM 2 MG: 2 INJECTION INTRAMUSCULAR; INTRAVENOUS at 00:47

## 2020-01-19 RX ADMIN — LEVETIRACETAM 1000 MG: 10 INJECTION, SOLUTION INTRAVENOUS at 16:05

## 2020-01-19 RX ADMIN — SODIUM CHLORIDE 150 MG PE: 9 INJECTION, SOLUTION INTRAVENOUS at 21:07

## 2020-01-19 RX ADMIN — DEXMEDETOMIDINE HYDROCHLORIDE 1.4 MCG/KG/HR: 400 INJECTION INTRAVENOUS at 01:39

## 2020-01-19 RX ADMIN — LEVETIRACETAM 1000 MG: 10 INJECTION, SOLUTION INTRAVENOUS at 21:16

## 2020-01-19 RX ADMIN — PIPERACILLIN SODIUM AND TAZOBACTAM SODIUM 3.38 G: 3; .375 INJECTION, POWDER, LYOPHILIZED, FOR SOLUTION INTRAVENOUS at 22:35

## 2020-01-19 RX ADMIN — ENOXAPARIN SODIUM 40 MG: 40 INJECTION SUBCUTANEOUS at 22:33

## 2020-01-19 RX ADMIN — PROPOFOL 50 MCG/KG/MIN: 10 INJECTION, EMULSION INTRAVENOUS at 23:36

## 2020-01-19 RX ADMIN — PROPOFOL 10 MCG/KG/MIN: 10 INJECTION, EMULSION INTRAVENOUS at 03:56

## 2020-01-19 RX ADMIN — SODIUM CHLORIDE 150 MG PE: 9 INJECTION, SOLUTION INTRAVENOUS at 05:42

## 2020-01-19 RX ADMIN — VANCOMYCIN HYDROCHLORIDE 1250 MG: 500 INJECTION, POWDER, LYOPHILIZED, FOR SOLUTION INTRAVENOUS at 19:43

## 2020-01-19 RX ADMIN — ACETAMINOPHEN 650 MG: 650 SUPPOSITORY RECTAL at 15:01

## 2020-01-20 ENCOUNTER — APPOINTMENT (OUTPATIENT)
Dept: GENERAL RADIOLOGY | Facility: HOSPITAL | Age: 40
End: 2020-01-20

## 2020-01-20 LAB
ANION GAP SERPL CALCULATED.3IONS-SCNC: 10.6 MMOL/L (ref 5–15)
ARTERIAL PATENCY WRIST A: POSITIVE
ARTERIAL PATENCY WRIST A: POSITIVE
ATMOSPHERIC PRESS: 750 MMHG
ATMOSPHERIC PRESS: 753 MMHG
BACTERIA SPEC RESP CULT: ABNORMAL
BACTERIA SPEC RESP CULT: ABNORMAL
BASE EXCESS BLDA CALC-SCNC: 3.6 MMOL/L (ref 0–2)
BASE EXCESS BLDA CALC-SCNC: 4.8 MMOL/L (ref 0–2)
BASOPHILS # BLD AUTO: 0.03 10*3/MM3 (ref 0–0.2)
BASOPHILS NFR BLD AUTO: 0.3 % (ref 0–1.5)
BDY SITE: ABNORMAL
BDY SITE: ABNORMAL
BODY TEMPERATURE: 37 C
BODY TEMPERATURE: 37 C
BUN BLD-MCNC: 8 MG/DL (ref 6–20)
BUN/CREAT SERPL: 11.4 (ref 7–25)
CALCIUM SPEC-SCNC: 8.4 MG/DL (ref 8.6–10.5)
CHLORIDE SERPL-SCNC: 107 MMOL/L (ref 98–107)
CK SERPL-CCNC: 297 U/L (ref 20–200)
CO2 SERPL-SCNC: 26.4 MMOL/L (ref 22–29)
CREAT BLD-MCNC: 0.7 MG/DL (ref 0.76–1.27)
DEPRECATED RDW RBC AUTO: 45.1 FL (ref 37–54)
EOSINOPHIL # BLD AUTO: 0.06 10*3/MM3 (ref 0–0.4)
EOSINOPHIL NFR BLD AUTO: 0.5 % (ref 0.3–6.2)
ERYTHROCYTE [DISTWIDTH] IN BLOOD BY AUTOMATED COUNT: 12.5 % (ref 12.3–15.4)
GFR SERPL CREATININE-BSD FRML MDRD: 125 ML/MIN/1.73
GLUCOSE BLD-MCNC: 114 MG/DL (ref 65–99)
GRAM STN SPEC: ABNORMAL
GRAM STN SPEC: ABNORMAL
HCO3 BLDA-SCNC: 27.5 MMOL/L (ref 20–26)
HCO3 BLDA-SCNC: 28.3 MMOL/L (ref 20–26)
HCT VFR BLD AUTO: 34.3 % (ref 37.5–51)
HGB BLD-MCNC: 11.5 G/DL (ref 13–17.7)
HGB BLDA-MCNC: 11.6 G/DL (ref 14–18)
HGB BLDA-MCNC: 12.5 G/DL (ref 14–18)
HOROWITZ INDEX BLD+IHG-RTO: 35 %
HOROWITZ INDEX BLD+IHG-RTO: 35 %
IMM GRANULOCYTES # BLD AUTO: 0.12 10*3/MM3 (ref 0–0.05)
IMM GRANULOCYTES NFR BLD AUTO: 1 % (ref 0–0.5)
LYMPHOCYTES # BLD AUTO: 1.39 10*3/MM3 (ref 0.7–3.1)
LYMPHOCYTES NFR BLD AUTO: 11.7 % (ref 19.6–45.3)
MAGNESIUM SERPL-MCNC: 1.7 MG/DL (ref 1.6–2.6)
MCH RBC QN AUTO: 32.4 PG (ref 26.6–33)
MCHC RBC AUTO-ENTMCNC: 33.5 G/DL (ref 31.5–35.7)
MCV RBC AUTO: 96.6 FL (ref 79–97)
MODALITY: ABNORMAL
MODALITY: ABNORMAL
MONOCYTES # BLD AUTO: 1.03 10*3/MM3 (ref 0.1–0.9)
MONOCYTES NFR BLD AUTO: 8.7 % (ref 5–12)
NEUTROPHILS # BLD AUTO: 9.2 10*3/MM3 (ref 1.7–7)
NEUTROPHILS NFR BLD AUTO: 77.8 % (ref 42.7–76)
NRBC BLD AUTO-RTO: 0 /100 WBC (ref 0–0.2)
PCO2 BLDA: 36.8 MM HG (ref 35–45)
PCO2 BLDA: 38.3 MM HG (ref 35–45)
PCO2 TEMP ADJ BLD: 36.8 MM HG (ref 35–45)
PCO2 TEMP ADJ BLD: 38.3 MM HG (ref 35–45)
PEEP RESPIRATORY: 5 CM[H2O]
PEEP RESPIRATORY: 5 CM[H2O]
PH BLDA: 7.46 PH UNITS (ref 7.35–7.45)
PH BLDA: 7.49 PH UNITS (ref 7.35–7.45)
PH, TEMP CORRECTED: 7.46 PH UNITS (ref 7.35–7.45)
PH, TEMP CORRECTED: 7.49 PH UNITS (ref 7.35–7.45)
PLATELET # BLD AUTO: 139 10*3/MM3 (ref 140–450)
PMV BLD AUTO: 9.4 FL (ref 6–12)
PO2 BLDA: 72.4 MM HG (ref 83–108)
PO2 BLDA: 87.2 MM HG (ref 83–108)
PO2 TEMP ADJ BLD: 72.4 MM HG (ref 83–108)
PO2 TEMP ADJ BLD: 87.2 MM HG (ref 83–108)
POTASSIUM BLD-SCNC: 3.4 MMOL/L (ref 3.5–5.2)
POTASSIUM BLD-SCNC: 3.6 MMOL/L (ref 3.5–5.2)
RBC # BLD AUTO: 3.55 10*6/MM3 (ref 4.14–5.8)
SAO2 % BLDCOA: 95.3 % (ref 94–99)
SAO2 % BLDCOA: 97.4 % (ref 94–99)
SET MECH RESP RATE: 20
SODIUM BLD-SCNC: 144 MMOL/L (ref 136–145)
VENTILATOR MODE: ABNORMAL
VENTILATOR MODE: AC
VT ON VENT VENT: 550 ML
WBC NRBC COR # BLD: 11.83 10*3/MM3 (ref 3.4–10.8)

## 2020-01-20 PROCEDURE — 25010000002 PIPERACILLIN SOD-TAZOBACTAM PER 1 G: Performed by: HOSPITALIST

## 2020-01-20 PROCEDURE — 25010000002 PIPERACILLIN-TAZOBACTAM: Performed by: HOSPITALIST

## 2020-01-20 PROCEDURE — 99233 SBSQ HOSP IP/OBS HIGH 50: CPT | Performed by: NURSE PRACTITIONER

## 2020-01-20 PROCEDURE — 99232 SBSQ HOSP IP/OBS MODERATE 35: CPT | Performed by: INTERNAL MEDICINE

## 2020-01-20 PROCEDURE — 25010000002 VANCOMYCIN 1 G RECONSTITUTED SOLUTION 1 EACH VIAL: Performed by: INTERNAL MEDICINE

## 2020-01-20 PROCEDURE — 82803 BLOOD GASES ANY COMBINATION: CPT

## 2020-01-20 PROCEDURE — 85025 COMPLETE CBC W/AUTO DIFF WBC: CPT | Performed by: NURSE PRACTITIONER

## 2020-01-20 PROCEDURE — 94799 UNLISTED PULMONARY SVC/PX: CPT

## 2020-01-20 PROCEDURE — 36600 WITHDRAWAL OF ARTERIAL BLOOD: CPT

## 2020-01-20 PROCEDURE — 25010000003 POTASSIUM CHLORIDE 10 MEQ/100ML SOLUTION: Performed by: NURSE PRACTITIONER

## 2020-01-20 PROCEDURE — 25010000003 LEVETIRACETAM IN NACL 0.75% 1000 MG/100ML SOLUTION: Performed by: PSYCHIATRY & NEUROLOGY

## 2020-01-20 PROCEDURE — 71045 X-RAY EXAM CHEST 1 VIEW: CPT

## 2020-01-20 PROCEDURE — 82550 ASSAY OF CK (CPK): CPT | Performed by: NURSE PRACTITIONER

## 2020-01-20 PROCEDURE — 25010000002 LORAZEPAM PER 2 MG: Performed by: NURSE PRACTITIONER

## 2020-01-20 PROCEDURE — 83735 ASSAY OF MAGNESIUM: CPT | Performed by: NURSE PRACTITIONER

## 2020-01-20 PROCEDURE — 94003 VENT MGMT INPAT SUBQ DAY: CPT

## 2020-01-20 PROCEDURE — 25010000002 FOSPHENYTOIN 100 MG PE/2ML SOLUTION 2 ML VIAL: Performed by: PSYCHIATRY & NEUROLOGY

## 2020-01-20 PROCEDURE — 25010000002 MAGNESIUM SULFATE 2 GM/50ML SOLUTION: Performed by: NURSE PRACTITIONER

## 2020-01-20 PROCEDURE — 99291 CRITICAL CARE FIRST HOUR: CPT | Performed by: NURSE PRACTITIONER

## 2020-01-20 PROCEDURE — 80048 BASIC METABOLIC PNL TOTAL CA: CPT | Performed by: NURSE PRACTITIONER

## 2020-01-20 PROCEDURE — 93005 ELECTROCARDIOGRAM TRACING: CPT | Performed by: NURSE PRACTITIONER

## 2020-01-20 PROCEDURE — 25010000002 VANCOMYCIN 750 MG RECONSTITUTED SOLUTION 1 EACH VIAL: Performed by: INTERNAL MEDICINE

## 2020-01-20 PROCEDURE — 25010000002 FENTANYL CITRATE (PF) 100 MCG/2ML SOLUTION: Performed by: HOSPITALIST

## 2020-01-20 PROCEDURE — 25010000002 ENOXAPARIN PER 10 MG: Performed by: HOSPITALIST

## 2020-01-20 PROCEDURE — 84132 ASSAY OF SERUM POTASSIUM: CPT | Performed by: NURSE PRACTITIONER

## 2020-01-20 PROCEDURE — 25010000002 VANCOMYCIN PER 500 MG: Performed by: INTERNAL MEDICINE

## 2020-01-20 PROCEDURE — 25010000002 PROPOFOL 10 MG/ML EMULSION: Performed by: NURSE PRACTITIONER

## 2020-01-20 RX ORDER — POTASSIUM CHLORIDE 1.5 G/1.77G
40 POWDER, FOR SOLUTION ORAL AS NEEDED
Status: DISCONTINUED | OUTPATIENT
Start: 2020-01-20 | End: 2020-01-26 | Stop reason: HOSPADM

## 2020-01-20 RX ORDER — POTASSIUM CHLORIDE 20 MEQ/1
40 TABLET, EXTENDED RELEASE ORAL AS NEEDED
Status: DISCONTINUED | OUTPATIENT
Start: 2020-01-20 | End: 2020-01-26 | Stop reason: HOSPADM

## 2020-01-20 RX ORDER — MAGNESIUM SULFATE HEPTAHYDRATE 40 MG/ML
2 INJECTION, SOLUTION INTRAVENOUS AS NEEDED
Status: DISCONTINUED | OUTPATIENT
Start: 2020-01-20 | End: 2020-01-26 | Stop reason: HOSPADM

## 2020-01-20 RX ORDER — MAGNESIUM SULFATE 1 G/100ML
1 INJECTION INTRAVENOUS AS NEEDED
Status: DISCONTINUED | OUTPATIENT
Start: 2020-01-20 | End: 2020-01-26 | Stop reason: HOSPADM

## 2020-01-20 RX ORDER — POTASSIUM CHLORIDE 7.45 MG/ML
10 INJECTION INTRAVENOUS
Status: DISCONTINUED | OUTPATIENT
Start: 2020-01-20 | End: 2020-01-26 | Stop reason: HOSPADM

## 2020-01-20 RX ORDER — PROPOFOL 10 MG/ML
VIAL (ML) INTRAVENOUS
Status: DISPENSED
Start: 2020-01-20 | End: 2020-01-21

## 2020-01-20 RX ORDER — DEXMEDETOMIDINE HYDROCHLORIDE 4 UG/ML
INJECTION, SOLUTION INTRAVENOUS
Status: DISPENSED
Start: 2020-01-20 | End: 2020-01-20

## 2020-01-20 RX ORDER — MAGNESIUM SULFATE HEPTAHYDRATE 40 MG/ML
4 INJECTION, SOLUTION INTRAVENOUS AS NEEDED
Status: DISCONTINUED | OUTPATIENT
Start: 2020-01-20 | End: 2020-01-26 | Stop reason: HOSPADM

## 2020-01-20 RX ADMIN — SODIUM CHLORIDE, PRESERVATIVE FREE 10 ML: 5 INJECTION INTRAVENOUS at 20:20

## 2020-01-20 RX ADMIN — SODIUM CHLORIDE 150 MG PE: 9 INJECTION, SOLUTION INTRAVENOUS at 21:01

## 2020-01-20 RX ADMIN — PROPOFOL 20 MCG/KG/MIN: 10 INJECTION, EMULSION INTRAVENOUS at 07:53

## 2020-01-20 RX ADMIN — SODIUM CHLORIDE 150 MG PE: 9 INJECTION, SOLUTION INTRAVENOUS at 05:14

## 2020-01-20 RX ADMIN — FENTANYL CITRATE 25 MCG: 50 INJECTION INTRAMUSCULAR; INTRAVENOUS at 03:04

## 2020-01-20 RX ADMIN — ENOXAPARIN SODIUM 40 MG: 40 INJECTION SUBCUTANEOUS at 22:31

## 2020-01-20 RX ADMIN — ACETAMINOPHEN 650 MG: 325 TABLET, FILM COATED ORAL at 05:14

## 2020-01-20 RX ADMIN — SODIUM CHLORIDE, PRESERVATIVE FREE 10 ML: 5 INJECTION INTRAVENOUS at 08:35

## 2020-01-20 RX ADMIN — PIPERACILLIN SODIUM AND TAZOBACTAM SODIUM 3.38 G: 3; .375 INJECTION, POWDER, LYOPHILIZED, FOR SOLUTION INTRAVENOUS at 04:49

## 2020-01-20 RX ADMIN — ACETAMINOPHEN 650 MG: 325 TABLET, FILM COATED ORAL at 17:37

## 2020-01-20 RX ADMIN — PIPERACILLIN SODIUM AND TAZOBACTAM SODIUM 3.38 G: 3; .375 INJECTION, POWDER, LYOPHILIZED, FOR SOLUTION INTRAVENOUS at 11:19

## 2020-01-20 RX ADMIN — DEXMEDETOMIDINE HYDROCHLORIDE 1.3 MCG/KG/HR: 400 INJECTION INTRAVENOUS at 06:16

## 2020-01-20 RX ADMIN — PIPERACILLIN SODIUM AND TAZOBACTAM SODIUM 3.38 G: 3; .375 INJECTION, POWDER, LYOPHILIZED, FOR SOLUTION INTRAVENOUS at 17:19

## 2020-01-20 RX ADMIN — DEXMEDETOMIDINE HYDROCHLORIDE 1.2 MCG/KG/HR: 400 INJECTION INTRAVENOUS at 19:03

## 2020-01-20 RX ADMIN — VANCOMYCIN HYDROCHLORIDE 1250 MG: 500 INJECTION, POWDER, LYOPHILIZED, FOR SOLUTION INTRAVENOUS at 13:19

## 2020-01-20 RX ADMIN — SODIUM CHLORIDE 150 MG PE: 9 INJECTION, SOLUTION INTRAVENOUS at 15:00

## 2020-01-20 RX ADMIN — DEXMEDETOMIDINE HYDROCHLORIDE 1.3 MCG/KG/HR: 400 INJECTION INTRAVENOUS at 02:15

## 2020-01-20 RX ADMIN — POTASSIUM CHLORIDE 10 MEQ: 10 INJECTION, SOLUTION INTRAVENOUS at 22:15

## 2020-01-20 RX ADMIN — SODIUM CHLORIDE, PRESERVATIVE FREE 10 ML: 5 INJECTION INTRAVENOUS at 20:19

## 2020-01-20 RX ADMIN — DEXMEDETOMIDINE HYDROCHLORIDE 1.2 MCG/KG/HR: 400 INJECTION INTRAVENOUS at 15:00

## 2020-01-20 RX ADMIN — PROPOFOL 50 MCG/KG/MIN: 10 INJECTION, EMULSION INTRAVENOUS at 17:37

## 2020-01-20 RX ADMIN — VANCOMYCIN HYDROCHLORIDE 1250 MG: 500 INJECTION, POWDER, LYOPHILIZED, FOR SOLUTION INTRAVENOUS at 19:50

## 2020-01-20 RX ADMIN — LEVETIRACETAM 1000 MG: 10 INJECTION, SOLUTION INTRAVENOUS at 03:06

## 2020-01-20 RX ADMIN — LEVETIRACETAM 1000 MG: 10 INJECTION, SOLUTION INTRAVENOUS at 21:17

## 2020-01-20 RX ADMIN — MAGNESIUM SULFATE HEPTAHYDRATE 2 G: 40 INJECTION, SOLUTION INTRAVENOUS at 13:24

## 2020-01-20 RX ADMIN — PIPERACILLIN SODIUM AND TAZOBACTAM SODIUM 3.38 G: 3; .375 INJECTION, POWDER, LYOPHILIZED, FOR SOLUTION INTRAVENOUS at 22:31

## 2020-01-20 RX ADMIN — PROPOFOL 50 MCG/KG/MIN: 10 INJECTION, EMULSION INTRAVENOUS at 04:07

## 2020-01-20 RX ADMIN — POTASSIUM CHLORIDE 40 MEQ: 1.5 POWDER, FOR SOLUTION ORAL at 17:37

## 2020-01-20 RX ADMIN — POTASSIUM CHLORIDE 10 MEQ: 10 INJECTION, SOLUTION INTRAVENOUS at 23:05

## 2020-01-20 RX ADMIN — SODIUM CHLORIDE, POTASSIUM CHLORIDE, SODIUM LACTATE AND CALCIUM CHLORIDE 50 ML/HR: 600; 310; 30; 20 INJECTION, SOLUTION INTRAVENOUS at 02:15

## 2020-01-20 RX ADMIN — PROPOFOL 50 MCG/KG/MIN: 10 INJECTION, EMULSION INTRAVENOUS at 21:01

## 2020-01-20 RX ADMIN — VANCOMYCIN HYDROCHLORIDE 1250 MG: 500 INJECTION, POWDER, LYOPHILIZED, FOR SOLUTION INTRAVENOUS at 03:21

## 2020-01-20 RX ADMIN — LEVETIRACETAM 1000 MG: 10 INJECTION, SOLUTION INTRAVENOUS at 15:58

## 2020-01-20 RX ADMIN — FENTANYL CITRATE 25 MCG: 50 INJECTION INTRAMUSCULAR; INTRAVENOUS at 09:43

## 2020-01-20 RX ADMIN — LORAZEPAM 2 MG: 2 INJECTION INTRAMUSCULAR; INTRAVENOUS at 03:28

## 2020-01-20 RX ADMIN — LEVETIRACETAM 1000 MG: 10 INJECTION, SOLUTION INTRAVENOUS at 08:34

## 2020-01-20 RX ADMIN — FAMOTIDINE 20 MG: 10 INJECTION, SOLUTION INTRAVENOUS at 08:35

## 2020-01-20 RX ADMIN — DEXMEDETOMIDINE HYDROCHLORIDE 1.2 MCG/KG/HR: 400 INJECTION INTRAVENOUS at 23:36

## 2020-01-20 RX ADMIN — PROPOFOL 50 MCG/KG/MIN: 10 INJECTION, EMULSION INTRAVENOUS at 13:38

## 2020-01-20 RX ADMIN — LORAZEPAM 2 MG: 2 INJECTION INTRAMUSCULAR; INTRAVENOUS at 10:11

## 2020-01-20 RX ADMIN — ACETAMINOPHEN 650 MG: 325 TABLET, FILM COATED ORAL at 11:31

## 2020-01-20 RX ADMIN — DEXMEDETOMIDINE HYDROCHLORIDE 1.2 MCG/KG/HR: 400 INJECTION INTRAVENOUS at 11:10

## 2020-01-20 RX ADMIN — POTASSIUM CHLORIDE 40 MEQ: 1.5 POWDER, FOR SOLUTION ORAL at 13:21

## 2020-01-21 ENCOUNTER — APPOINTMENT (OUTPATIENT)
Dept: GENERAL RADIOLOGY | Facility: HOSPITAL | Age: 40
End: 2020-01-21

## 2020-01-21 LAB
ANION GAP SERPL CALCULATED.3IONS-SCNC: 9.1 MMOL/L (ref 5–15)
ARTERIAL PATENCY WRIST A: POSITIVE
ATMOSPHERIC PRESS: 754 MMHG
BACTERIA SPEC AEROBE CULT: NORMAL
BACTERIA SPEC AEROBE CULT: NORMAL
BACTERIA UR QL AUTO: ABNORMAL /HPF
BASE EXCESS BLDA CALC-SCNC: 4.3 MMOL/L (ref 0–2)
BASOPHILS # BLD AUTO: 0.04 10*3/MM3 (ref 0–0.2)
BASOPHILS NFR BLD AUTO: 0.4 % (ref 0–1.5)
BDY SITE: ABNORMAL
BILIRUB UR QL STRIP: NEGATIVE
BODY TEMPERATURE: 37 C
BUN BLD-MCNC: 7 MG/DL (ref 6–20)
BUN/CREAT SERPL: 11.1 (ref 7–25)
CALCIUM SPEC-SCNC: 7.7 MG/DL (ref 8.6–10.5)
CHLORIDE SERPL-SCNC: 107 MMOL/L (ref 98–107)
CHLORIDE UR-SCNC: 155 MMOL/L
CK SERPL-CCNC: 150 U/L (ref 20–200)
CLARITY UR: CLEAR
CO2 SERPL-SCNC: 25.9 MMOL/L (ref 22–29)
COLOR UR: YELLOW
CPAP: 5 CMH2O
CREAT BLD-MCNC: 0.63 MG/DL (ref 0.76–1.27)
CREAT UR-MCNC: 42.4 MG/DL
DEPRECATED RDW RBC AUTO: 44.9 FL (ref 37–54)
EOSINOPHIL # BLD AUTO: 0.12 10*3/MM3 (ref 0–0.4)
EOSINOPHIL NFR BLD AUTO: 1.3 % (ref 0.3–6.2)
ERYTHROCYTE [DISTWIDTH] IN BLOOD BY AUTOMATED COUNT: 12.5 % (ref 12.3–15.4)
GFR SERPL CREATININE-BSD FRML MDRD: 141 ML/MIN/1.73
GLUCOSE BLD-MCNC: 128 MG/DL (ref 65–99)
GLUCOSE UR STRIP-MCNC: NEGATIVE MG/DL
HCO3 BLDA-SCNC: 27.3 MMOL/L (ref 20–26)
HCT VFR BLD AUTO: 34.7 % (ref 37.5–51)
HGB BLD-MCNC: 11.5 G/DL (ref 13–17.7)
HGB BLDA-MCNC: 12.6 G/DL (ref 14–18)
HGB UR QL STRIP.AUTO: ABNORMAL
HOROWITZ INDEX BLD+IHG-RTO: 35 %
HYALINE CASTS UR QL AUTO: ABNORMAL /LPF
IMM GRANULOCYTES # BLD AUTO: 0.23 10*3/MM3 (ref 0–0.05)
IMM GRANULOCYTES NFR BLD AUTO: 2.5 % (ref 0–0.5)
KETONES UR QL STRIP: NEGATIVE
LEUKOCYTE ESTERASE UR QL STRIP.AUTO: NEGATIVE
LYMPHOCYTES # BLD AUTO: 1.47 10*3/MM3 (ref 0.7–3.1)
LYMPHOCYTES NFR BLD AUTO: 15.9 % (ref 19.6–45.3)
MAGNESIUM SERPL-MCNC: 1.9 MG/DL (ref 1.6–2.6)
MCH RBC QN AUTO: 32.1 PG (ref 26.6–33)
MCHC RBC AUTO-ENTMCNC: 33.1 G/DL (ref 31.5–35.7)
MCV RBC AUTO: 96.9 FL (ref 79–97)
MODALITY: ABNORMAL
MONOCYTES # BLD AUTO: 0.78 10*3/MM3 (ref 0.1–0.9)
MONOCYTES NFR BLD AUTO: 8.5 % (ref 5–12)
NEUTROPHILS # BLD AUTO: 6.59 10*3/MM3 (ref 1.7–7)
NEUTROPHILS NFR BLD AUTO: 71.4 % (ref 42.7–76)
NITRITE UR QL STRIP: NEGATIVE
NRBC BLD AUTO-RTO: 0 /100 WBC (ref 0–0.2)
OSMOLALITY UR: 459 MOSM/KG
PCO2 BLDA: 34.7 MM HG (ref 35–45)
PCO2 TEMP ADJ BLD: 34.7 MM HG (ref 35–45)
PH BLDA: 7.5 PH UNITS (ref 7.35–7.45)
PH UR STRIP.AUTO: 8.5 [PH] (ref 4.5–8)
PH, TEMP CORRECTED: 7.5 PH UNITS (ref 7.35–7.45)
PLATELET # BLD AUTO: 157 10*3/MM3 (ref 140–450)
PMV BLD AUTO: 9.4 FL (ref 6–12)
PO2 BLDA: 73.2 MM HG (ref 83–108)
PO2 TEMP ADJ BLD: 73.2 MM HG (ref 83–108)
POTASSIUM BLD-SCNC: 3.7 MMOL/L (ref 3.5–5.2)
POTASSIUM UR-SCNC: 10.3 MMOL/L
PROT UR QL STRIP: NEGATIVE
PSV: 12 CMH2O
RBC # BLD AUTO: 3.58 10*6/MM3 (ref 4.14–5.8)
RBC # UR: ABNORMAL /HPF
REF LAB TEST METHOD: ABNORMAL
SAO2 % BLDCOA: 96 % (ref 94–99)
SODIUM BLD-SCNC: 142 MMOL/L (ref 136–145)
SODIUM UR-SCNC: 169 MMOL/L
SP GR UR STRIP: 1.02 (ref 1–1.03)
SQUAMOUS #/AREA URNS HPF: ABNORMAL /HPF
UROBILINOGEN UR QL STRIP: ABNORMAL
VENTILATOR MODE: ABNORMAL
WBC NRBC COR # BLD: 9.23 10*3/MM3 (ref 3.4–10.8)
WBC UR QL AUTO: ABNORMAL /HPF

## 2020-01-21 PROCEDURE — 83935 ASSAY OF URINE OSMOLALITY: CPT | Performed by: INTERNAL MEDICINE

## 2020-01-21 PROCEDURE — 25010000002 FOSPHENYTOIN 100 MG PE/2ML SOLUTION 2 ML VIAL: Performed by: PSYCHIATRY & NEUROLOGY

## 2020-01-21 PROCEDURE — 85025 COMPLETE CBC W/AUTO DIFF WBC: CPT | Performed by: NURSE PRACTITIONER

## 2020-01-21 PROCEDURE — 84300 ASSAY OF URINE SODIUM: CPT | Performed by: INTERNAL MEDICINE

## 2020-01-21 PROCEDURE — 94799 UNLISTED PULMONARY SVC/PX: CPT

## 2020-01-21 PROCEDURE — 25010000003 POTASSIUM CHLORIDE 10 MEQ/100ML SOLUTION: Performed by: NURSE PRACTITIONER

## 2020-01-21 PROCEDURE — 25010000002 PROPOFOL 10 MG/ML EMULSION: Performed by: NURSE PRACTITIONER

## 2020-01-21 PROCEDURE — 82803 BLOOD GASES ANY COMBINATION: CPT

## 2020-01-21 PROCEDURE — 25010000002 VANCOMYCIN 750 MG RECONSTITUTED SOLUTION 1 EACH VIAL: Performed by: INTERNAL MEDICINE

## 2020-01-21 PROCEDURE — 99291 CRITICAL CARE FIRST HOUR: CPT | Performed by: NURSE PRACTITIONER

## 2020-01-21 PROCEDURE — 25010000003 LEVETIRACETAM IN NACL 0.75% 1000 MG/100ML SOLUTION: Performed by: PSYCHIATRY & NEUROLOGY

## 2020-01-21 PROCEDURE — 25010000002 LORAZEPAM PER 2 MG: Performed by: NURSE PRACTITIONER

## 2020-01-21 PROCEDURE — 81001 URINALYSIS AUTO W/SCOPE: CPT | Performed by: INTERNAL MEDICINE

## 2020-01-21 PROCEDURE — 93005 ELECTROCARDIOGRAM TRACING: CPT | Performed by: NURSE PRACTITIONER

## 2020-01-21 PROCEDURE — 82550 ASSAY OF CK (CPK): CPT | Performed by: NURSE PRACTITIONER

## 2020-01-21 PROCEDURE — 82436 ASSAY OF URINE CHLORIDE: CPT | Performed by: INTERNAL MEDICINE

## 2020-01-21 PROCEDURE — 82570 ASSAY OF URINE CREATININE: CPT | Performed by: INTERNAL MEDICINE

## 2020-01-21 PROCEDURE — 36600 WITHDRAWAL OF ARTERIAL BLOOD: CPT

## 2020-01-21 PROCEDURE — 80048 BASIC METABOLIC PNL TOTAL CA: CPT | Performed by: NURSE PRACTITIONER

## 2020-01-21 PROCEDURE — 25010000002 VANCOMYCIN PER 500 MG: Performed by: INTERNAL MEDICINE

## 2020-01-21 PROCEDURE — 99233 SBSQ HOSP IP/OBS HIGH 50: CPT | Performed by: NURSE PRACTITIONER

## 2020-01-21 PROCEDURE — 25010000002 MAGNESIUM SULFATE 2 GM/50ML SOLUTION: Performed by: NURSE PRACTITIONER

## 2020-01-21 PROCEDURE — 93010 ELECTROCARDIOGRAM REPORT: CPT | Performed by: INTERNAL MEDICINE

## 2020-01-21 PROCEDURE — 71045 X-RAY EXAM CHEST 1 VIEW: CPT

## 2020-01-21 PROCEDURE — 84133 ASSAY OF URINE POTASSIUM: CPT | Performed by: INTERNAL MEDICINE

## 2020-01-21 PROCEDURE — 83735 ASSAY OF MAGNESIUM: CPT | Performed by: NURSE PRACTITIONER

## 2020-01-21 PROCEDURE — 25010000002 ENOXAPARIN PER 10 MG: Performed by: HOSPITALIST

## 2020-01-21 PROCEDURE — 25010000002 PIPERACILLIN SOD-TAZOBACTAM PER 1 G: Performed by: HOSPITALIST

## 2020-01-21 PROCEDURE — 94640 AIRWAY INHALATION TREATMENT: CPT

## 2020-01-21 RX ORDER — LEVETIRACETAM 100 MG/ML
1000 SOLUTION ORAL EVERY 12 HOURS SCHEDULED
Status: DISCONTINUED | OUTPATIENT
Start: 2020-01-21 | End: 2020-01-23

## 2020-01-21 RX ORDER — CHOLECALCIFEROL (VITAMIN D3) 125 MCG
5 CAPSULE ORAL NIGHTLY PRN
Status: DISCONTINUED | OUTPATIENT
Start: 2020-01-21 | End: 2020-01-23

## 2020-01-21 RX ORDER — DEXMEDETOMIDINE HYDROCHLORIDE 4 UG/ML
.2-1.5 INJECTION, SOLUTION INTRAVENOUS
Status: DISCONTINUED | OUTPATIENT
Start: 2020-01-22 | End: 2020-01-22

## 2020-01-21 RX ORDER — LEVETIRACETAM 100 MG/ML
1000 SOLUTION ORAL EVERY 12 HOURS SCHEDULED
Status: DISCONTINUED | OUTPATIENT
Start: 2020-01-21 | End: 2020-01-21

## 2020-01-21 RX ORDER — CHOLECALCIFEROL (VITAMIN D3) 125 MCG
CAPSULE ORAL
Status: COMPLETED
Start: 2020-01-21 | End: 2020-01-21

## 2020-01-21 RX ORDER — IPRATROPIUM BROMIDE AND ALBUTEROL SULFATE 2.5; .5 MG/3ML; MG/3ML
3 SOLUTION RESPIRATORY (INHALATION)
Status: DISCONTINUED | OUTPATIENT
Start: 2020-01-21 | End: 2020-01-23

## 2020-01-21 RX ORDER — DEXMEDETOMIDINE HYDROCHLORIDE 4 UG/ML
INJECTION, SOLUTION INTRAVENOUS
Status: COMPLETED
Start: 2020-01-21 | End: 2020-01-21

## 2020-01-21 RX ADMIN — SODIUM CHLORIDE, PRESERVATIVE FREE 10 ML: 5 INJECTION INTRAVENOUS at 08:43

## 2020-01-21 RX ADMIN — IPRATROPIUM BROMIDE AND ALBUTEROL SULFATE 3 ML: .5; 3 SOLUTION RESPIRATORY (INHALATION) at 19:31

## 2020-01-21 RX ADMIN — DEXMEDETOMIDINE HYDROCHLORIDE 0.2 MCG/KG/HR: 400 INJECTION INTRAVENOUS at 23:23

## 2020-01-21 RX ADMIN — LEVETIRACETAM 1000 MG: 500 SOLUTION ORAL at 20:09

## 2020-01-21 RX ADMIN — PIPERACILLIN SODIUM AND TAZOBACTAM SODIUM 3.38 G: 3; .375 INJECTION, POWDER, LYOPHILIZED, FOR SOLUTION INTRAVENOUS at 04:34

## 2020-01-21 RX ADMIN — SODIUM CHLORIDE, PRESERVATIVE FREE 10 ML: 5 INJECTION INTRAVENOUS at 20:09

## 2020-01-21 RX ADMIN — PROPOFOL 50 MCG/KG/MIN: 10 INJECTION, EMULSION INTRAVENOUS at 01:27

## 2020-01-21 RX ADMIN — SODIUM CHLORIDE 150 MG PE: 9 INJECTION, SOLUTION INTRAVENOUS at 05:03

## 2020-01-21 RX ADMIN — SODIUM CHLORIDE, PRESERVATIVE FREE 10 ML: 5 INJECTION INTRAVENOUS at 08:46

## 2020-01-21 RX ADMIN — VANCOMYCIN HYDROCHLORIDE 1250 MG: 500 INJECTION, POWDER, LYOPHILIZED, FOR SOLUTION INTRAVENOUS at 03:11

## 2020-01-21 RX ADMIN — PIPERACILLIN SODIUM AND TAZOBACTAM SODIUM 3.38 G: 3; .375 INJECTION, POWDER, LYOPHILIZED, FOR SOLUTION INTRAVENOUS at 18:07

## 2020-01-21 RX ADMIN — LEVETIRACETAM 1000 MG: 10 INJECTION, SOLUTION INTRAVENOUS at 02:54

## 2020-01-21 RX ADMIN — LEVETIRACETAM 1000 MG: 10 INJECTION, SOLUTION INTRAVENOUS at 08:43

## 2020-01-21 RX ADMIN — MAGNESIUM SULFATE HEPTAHYDRATE 2 G: 40 INJECTION, SOLUTION INTRAVENOUS at 06:52

## 2020-01-21 RX ADMIN — ENOXAPARIN SODIUM 40 MG: 40 INJECTION SUBCUTANEOUS at 22:34

## 2020-01-21 RX ADMIN — MELATONIN TAB 5 MG 5 MG: 5 TAB at 22:04

## 2020-01-21 RX ADMIN — ACETAMINOPHEN 650 MG: 325 TABLET, FILM COATED ORAL at 18:03

## 2020-01-21 RX ADMIN — POTASSIUM CHLORIDE 10 MEQ: 10 INJECTION, SOLUTION INTRAVENOUS at 01:16

## 2020-01-21 RX ADMIN — PIPERACILLIN SODIUM AND TAZOBACTAM SODIUM 3.38 G: 3; .375 INJECTION, POWDER, LYOPHILIZED, FOR SOLUTION INTRAVENOUS at 11:51

## 2020-01-21 RX ADMIN — DEXMEDETOMIDINE HYDROCHLORIDE 0.2 MCG/KG/HR: 4 INJECTION, SOLUTION INTRAVENOUS at 23:23

## 2020-01-21 RX ADMIN — DEXMEDETOMIDINE HYDROCHLORIDE 1.2 MCG/KG/HR: 400 INJECTION INTRAVENOUS at 04:00

## 2020-01-21 RX ADMIN — POTASSIUM CHLORIDE 10 MEQ: 10 INJECTION, SOLUTION INTRAVENOUS at 00:11

## 2020-01-21 RX ADMIN — LORAZEPAM 2 MG: 2 INJECTION INTRAMUSCULAR; INTRAVENOUS at 18:08

## 2020-01-21 RX ADMIN — IPRATROPIUM BROMIDE AND ALBUTEROL SULFATE 3 ML: .5; 3 SOLUTION RESPIRATORY (INHALATION) at 15:20

## 2020-01-21 RX ADMIN — LORAZEPAM 2 MG: 2 INJECTION INTRAMUSCULAR; INTRAVENOUS at 20:09

## 2020-01-21 RX ADMIN — DEXMEDETOMIDINE HYDROCHLORIDE 1 MCG/KG/HR: 400 INJECTION INTRAVENOUS at 08:46

## 2020-01-21 RX ADMIN — PIPERACILLIN SODIUM AND TAZOBACTAM SODIUM 3.38 G: 3; .375 INJECTION, POWDER, LYOPHILIZED, FOR SOLUTION INTRAVENOUS at 22:34

## 2020-01-21 RX ADMIN — FAMOTIDINE 20 MG: 10 INJECTION, SOLUTION INTRAVENOUS at 08:43

## 2020-01-21 RX ADMIN — PROPOFOL 45 MCG/KG/MIN: 10 INJECTION, EMULSION INTRAVENOUS at 06:10

## 2020-01-22 ENCOUNTER — APPOINTMENT (OUTPATIENT)
Dept: GENERAL RADIOLOGY | Facility: HOSPITAL | Age: 40
End: 2020-01-22

## 2020-01-22 LAB
ANION GAP SERPL CALCULATED.3IONS-SCNC: 10.9 MMOL/L (ref 5–15)
BASOPHILS # BLD AUTO: 0.05 10*3/MM3 (ref 0–0.2)
BASOPHILS NFR BLD AUTO: 0.4 % (ref 0–1.5)
BUN BLD-MCNC: 8 MG/DL (ref 6–20)
BUN/CREAT SERPL: 13.6 (ref 7–25)
CALCIUM SPEC-SCNC: 8.1 MG/DL (ref 8.6–10.5)
CHLORIDE SERPL-SCNC: 107 MMOL/L (ref 98–107)
CK SERPL-CCNC: 282 U/L (ref 20–200)
CO2 SERPL-SCNC: 25.1 MMOL/L (ref 22–29)
CREAT BLD-MCNC: 0.59 MG/DL (ref 0.76–1.27)
DEPRECATED RDW RBC AUTO: 42.5 FL (ref 37–54)
EOSINOPHIL # BLD AUTO: 0.08 10*3/MM3 (ref 0–0.4)
EOSINOPHIL NFR BLD AUTO: 0.6 % (ref 0.3–6.2)
ERYTHROCYTE [DISTWIDTH] IN BLOOD BY AUTOMATED COUNT: 12.1 % (ref 12.3–15.4)
GFR SERPL CREATININE-BSD FRML MDRD: >150 ML/MIN/1.73
GLUCOSE BLD-MCNC: 107 MG/DL (ref 65–99)
HCT VFR BLD AUTO: 33.8 % (ref 37.5–51)
HGB BLD-MCNC: 11.5 G/DL (ref 13–17.7)
IMM GRANULOCYTES # BLD AUTO: 0.29 10*3/MM3 (ref 0–0.05)
IMM GRANULOCYTES NFR BLD AUTO: 2.2 % (ref 0–0.5)
LYMPHOCYTES # BLD AUTO: 1.9 10*3/MM3 (ref 0.7–3.1)
LYMPHOCYTES NFR BLD AUTO: 14.7 % (ref 19.6–45.3)
MCH RBC QN AUTO: 32.6 PG (ref 26.6–33)
MCHC RBC AUTO-ENTMCNC: 34 G/DL (ref 31.5–35.7)
MCV RBC AUTO: 95.8 FL (ref 79–97)
MONOCYTES # BLD AUTO: 0.96 10*3/MM3 (ref 0.1–0.9)
MONOCYTES NFR BLD AUTO: 7.4 % (ref 5–12)
NEUTROPHILS # BLD AUTO: 9.67 10*3/MM3 (ref 1.7–7)
NEUTROPHILS NFR BLD AUTO: 74.7 % (ref 42.7–76)
NRBC BLD AUTO-RTO: 0 /100 WBC (ref 0–0.2)
PLATELET # BLD AUTO: 196 10*3/MM3 (ref 140–450)
PMV BLD AUTO: 9.5 FL (ref 6–12)
POTASSIUM BLD-SCNC: 3.4 MMOL/L (ref 3.5–5.2)
RBC # BLD AUTO: 3.53 10*6/MM3 (ref 4.14–5.8)
SODIUM BLD-SCNC: 143 MMOL/L (ref 136–145)
WBC NRBC COR # BLD: 12.95 10*3/MM3 (ref 3.4–10.8)

## 2020-01-22 PROCEDURE — 93010 ELECTROCARDIOGRAM REPORT: CPT | Performed by: INTERNAL MEDICINE

## 2020-01-22 PROCEDURE — 92610 EVALUATE SWALLOWING FUNCTION: CPT

## 2020-01-22 PROCEDURE — 85025 COMPLETE CBC W/AUTO DIFF WBC: CPT | Performed by: NURSE PRACTITIONER

## 2020-01-22 PROCEDURE — 94799 UNLISTED PULMONARY SVC/PX: CPT

## 2020-01-22 PROCEDURE — 93005 ELECTROCARDIOGRAM TRACING: CPT | Performed by: NURSE PRACTITIONER

## 2020-01-22 PROCEDURE — 25010000002 PIPERACILLIN-TAZOBACTAM: Performed by: HOSPITALIST

## 2020-01-22 PROCEDURE — 99233 SBSQ HOSP IP/OBS HIGH 50: CPT | Performed by: NURSE PRACTITIONER

## 2020-01-22 PROCEDURE — 71045 X-RAY EXAM CHEST 1 VIEW: CPT

## 2020-01-22 PROCEDURE — 97165 OT EVAL LOW COMPLEX 30 MIN: CPT

## 2020-01-22 PROCEDURE — 82550 ASSAY OF CK (CPK): CPT | Performed by: NURSE PRACTITIONER

## 2020-01-22 PROCEDURE — 80048 BASIC METABOLIC PNL TOTAL CA: CPT | Performed by: NURSE PRACTITIONER

## 2020-01-22 PROCEDURE — 97161 PT EVAL LOW COMPLEX 20 MIN: CPT

## 2020-01-22 RX ORDER — L.ACID,PARA/B.BIFIDUM/S.THERM 8B CELL
1 CAPSULE ORAL DAILY
Status: DISCONTINUED | OUTPATIENT
Start: 2020-01-22 | End: 2020-01-26 | Stop reason: HOSPADM

## 2020-01-22 RX ORDER — AMOXICILLIN AND CLAVULANATE POTASSIUM 875; 125 MG/1; MG/1
1 TABLET, FILM COATED ORAL EVERY 12 HOURS SCHEDULED
Status: COMPLETED | OUTPATIENT
Start: 2020-01-22 | End: 2020-01-25

## 2020-01-22 RX ORDER — FAMOTIDINE 20 MG/1
20 TABLET, FILM COATED ORAL
Status: DISCONTINUED | OUTPATIENT
Start: 2020-01-22 | End: 2020-01-26 | Stop reason: HOSPADM

## 2020-01-22 RX ORDER — TRAZODONE HYDROCHLORIDE 50 MG/1
50 TABLET ORAL ONCE
Status: COMPLETED | OUTPATIENT
Start: 2020-01-22 | End: 2020-01-22

## 2020-01-22 RX ADMIN — SODIUM CHLORIDE, PRESERVATIVE FREE 10 ML: 5 INJECTION INTRAVENOUS at 20:12

## 2020-01-22 RX ADMIN — TRAZODONE HYDROCHLORIDE 50 MG: 50 TABLET ORAL at 21:59

## 2020-01-22 RX ADMIN — ACETAMINOPHEN 650 MG: 325 TABLET, FILM COATED ORAL at 10:56

## 2020-01-22 RX ADMIN — LEVETIRACETAM 1000 MG: 500 SOLUTION ORAL at 20:06

## 2020-01-22 RX ADMIN — AMOXICILLIN AND CLAVULANATE POTASSIUM 1 TABLET: 875; 125 TABLET, FILM COATED ORAL at 10:56

## 2020-01-22 RX ADMIN — ACETAMINOPHEN 650 MG: 325 TABLET, FILM COATED ORAL at 21:24

## 2020-01-22 RX ADMIN — IPRATROPIUM BROMIDE AND ALBUTEROL SULFATE 3 ML: .5; 3 SOLUTION RESPIRATORY (INHALATION) at 15:58

## 2020-01-22 RX ADMIN — SODIUM CHLORIDE, PRESERVATIVE FREE 10 ML: 5 INJECTION INTRAVENOUS at 08:10

## 2020-01-22 RX ADMIN — AMOXICILLIN AND CLAVULANATE POTASSIUM 1 TABLET: 875; 125 TABLET, FILM COATED ORAL at 20:11

## 2020-01-22 RX ADMIN — IPRATROPIUM BROMIDE AND ALBUTEROL SULFATE 3 ML: .5; 3 SOLUTION RESPIRATORY (INHALATION) at 06:50

## 2020-01-22 RX ADMIN — FAMOTIDINE 20 MG: 20 TABLET ORAL at 17:15

## 2020-01-22 RX ADMIN — MELATONIN TAB 5 MG 5 MG: 5 TAB at 21:07

## 2020-01-22 RX ADMIN — FAMOTIDINE 20 MG: 10 INJECTION, SOLUTION INTRAVENOUS at 08:10

## 2020-01-22 RX ADMIN — SODIUM CHLORIDE, PRESERVATIVE FREE 10 ML: 5 INJECTION INTRAVENOUS at 20:11

## 2020-01-22 RX ADMIN — PIPERACILLIN SODIUM AND TAZOBACTAM SODIUM 3.38 G: 3; .375 INJECTION, POWDER, LYOPHILIZED, FOR SOLUTION INTRAVENOUS at 05:12

## 2020-01-22 RX ADMIN — IPRATROPIUM BROMIDE AND ALBUTEROL SULFATE 3 ML: .5; 3 SOLUTION RESPIRATORY (INHALATION) at 19:17

## 2020-01-22 RX ADMIN — Medication 1 CAPSULE: at 10:56

## 2020-01-22 RX ADMIN — IPRATROPIUM BROMIDE AND ALBUTEROL SULFATE 3 ML: .5; 3 SOLUTION RESPIRATORY (INHALATION) at 11:31

## 2020-01-22 RX ADMIN — LEVETIRACETAM 1000 MG: 500 SOLUTION ORAL at 08:10

## 2020-01-23 LAB
ALBUMIN SERPL-MCNC: 3.5 G/DL (ref 3.5–5.2)
ALP SERPL-CCNC: 70 U/L (ref 39–117)
ALT SERPL W P-5'-P-CCNC: 58 U/L (ref 1–41)
ANION GAP SERPL CALCULATED.3IONS-SCNC: 12.2 MMOL/L (ref 5–15)
AST SERPL-CCNC: 62 U/L (ref 1–40)
BASOPHILS # BLD AUTO: 0.07 10*3/MM3 (ref 0–0.2)
BASOPHILS NFR BLD AUTO: 0.6 % (ref 0–1.5)
BILIRUB CONJ SERPL-MCNC: <0.2 MG/DL (ref 0.2–0.3)
BILIRUB INDIRECT SERPL-MCNC: ABNORMAL MG/DL
BILIRUB SERPL-MCNC: 0.2 MG/DL (ref 0.2–1.2)
BUN BLD-MCNC: 9 MG/DL (ref 6–20)
BUN/CREAT SERPL: 17 (ref 7–25)
CALCIUM SPEC-SCNC: 8.6 MG/DL (ref 8.6–10.5)
CHLORIDE SERPL-SCNC: 105 MMOL/L (ref 98–107)
CO2 SERPL-SCNC: 23.8 MMOL/L (ref 22–29)
CREAT BLD-MCNC: 0.53 MG/DL (ref 0.76–1.27)
DEPRECATED RDW RBC AUTO: 42.5 FL (ref 37–54)
EOSINOPHIL # BLD AUTO: 0.16 10*3/MM3 (ref 0–0.4)
EOSINOPHIL NFR BLD AUTO: 1.4 % (ref 0.3–6.2)
ERYTHROCYTE [DISTWIDTH] IN BLOOD BY AUTOMATED COUNT: 12.1 % (ref 12.3–15.4)
GFR SERPL CREATININE-BSD FRML MDRD: >150 ML/MIN/1.73
GLUCOSE BLD-MCNC: 101 MG/DL (ref 65–99)
HCT VFR BLD AUTO: 36.6 % (ref 37.5–51)
HGB BLD-MCNC: 12.4 G/DL (ref 13–17.7)
IMM GRANULOCYTES # BLD AUTO: 0.59 10*3/MM3 (ref 0–0.05)
IMM GRANULOCYTES NFR BLD AUTO: 5.1 % (ref 0–0.5)
LYMPHOCYTES # BLD AUTO: 2.24 10*3/MM3 (ref 0.7–3.1)
LYMPHOCYTES NFR BLD AUTO: 19.5 % (ref 19.6–45.3)
MCH RBC QN AUTO: 32.1 PG (ref 26.6–33)
MCHC RBC AUTO-ENTMCNC: 33.9 G/DL (ref 31.5–35.7)
MCV RBC AUTO: 94.8 FL (ref 79–97)
MONOCYTES # BLD AUTO: 0.9 10*3/MM3 (ref 0.1–0.9)
MONOCYTES NFR BLD AUTO: 7.8 % (ref 5–12)
NEUTROPHILS # BLD AUTO: 7.55 10*3/MM3 (ref 1.7–7)
NEUTROPHILS NFR BLD AUTO: 65.6 % (ref 42.7–76)
NRBC BLD AUTO-RTO: 0 /100 WBC (ref 0–0.2)
PLAT MORPH BLD: NORMAL
PLATELET # BLD AUTO: 238 10*3/MM3 (ref 140–450)
PMV BLD AUTO: 8.9 FL (ref 6–12)
POTASSIUM BLD-SCNC: 3.5 MMOL/L (ref 3.5–5.2)
PROT SERPL-MCNC: 6.8 G/DL (ref 6–8.5)
RBC # BLD AUTO: 3.86 10*6/MM3 (ref 4.14–5.8)
RBC MORPH BLD: NORMAL
SODIUM BLD-SCNC: 141 MMOL/L (ref 136–145)
WBC MORPH BLD: NORMAL
WBC NRBC COR # BLD: 11.51 10*3/MM3 (ref 3.4–10.8)

## 2020-01-23 PROCEDURE — 93005 ELECTROCARDIOGRAM TRACING: CPT | Performed by: NURSE PRACTITIONER

## 2020-01-23 PROCEDURE — 25010000002 ENOXAPARIN PER 10 MG: Performed by: NURSE PRACTITIONER

## 2020-01-23 PROCEDURE — 93010 ELECTROCARDIOGRAM REPORT: CPT | Performed by: INTERNAL MEDICINE

## 2020-01-23 PROCEDURE — 97530 THERAPEUTIC ACTIVITIES: CPT

## 2020-01-23 PROCEDURE — 80076 HEPATIC FUNCTION PANEL: CPT | Performed by: NURSE PRACTITIONER

## 2020-01-23 PROCEDURE — 80307 DRUG TEST PRSMV CHEM ANLYZR: CPT | Performed by: HOSPITALIST

## 2020-01-23 PROCEDURE — 94799 UNLISTED PULMONARY SVC/PX: CPT

## 2020-01-23 PROCEDURE — 99233 SBSQ HOSP IP/OBS HIGH 50: CPT | Performed by: NURSE PRACTITIONER

## 2020-01-23 PROCEDURE — 85025 COMPLETE CBC W/AUTO DIFF WBC: CPT | Performed by: NURSE PRACTITIONER

## 2020-01-23 PROCEDURE — 80048 BASIC METABOLIC PNL TOTAL CA: CPT | Performed by: NURSE PRACTITIONER

## 2020-01-23 PROCEDURE — 97116 GAIT TRAINING THERAPY: CPT

## 2020-01-23 PROCEDURE — 85007 BL SMEAR W/DIFF WBC COUNT: CPT | Performed by: NURSE PRACTITIONER

## 2020-01-23 RX ORDER — LEVETIRACETAM 100 MG/ML
1000 SOLUTION ORAL EVERY 12 HOURS SCHEDULED
Status: DISCONTINUED | OUTPATIENT
Start: 2020-01-23 | End: 2020-01-23

## 2020-01-23 RX ORDER — IPRATROPIUM BROMIDE AND ALBUTEROL SULFATE 2.5; .5 MG/3ML; MG/3ML
3 SOLUTION RESPIRATORY (INHALATION) EVERY 4 HOURS PRN
Status: DISCONTINUED | OUTPATIENT
Start: 2020-01-23 | End: 2020-01-26 | Stop reason: HOSPADM

## 2020-01-23 RX ORDER — TRAZODONE HYDROCHLORIDE 50 MG/1
50 TABLET ORAL NIGHTLY PRN
Status: DISCONTINUED | OUTPATIENT
Start: 2020-01-23 | End: 2020-01-26 | Stop reason: HOSPADM

## 2020-01-23 RX ORDER — BUPROPION HYDROCHLORIDE 150 MG/1
150 TABLET, EXTENDED RELEASE ORAL EVERY 12 HOURS SCHEDULED
Status: DISCONTINUED | OUTPATIENT
Start: 2020-01-23 | End: 2020-01-26 | Stop reason: HOSPADM

## 2020-01-23 RX ORDER — CHOLECALCIFEROL (VITAMIN D3) 125 MCG
5 CAPSULE ORAL NIGHTLY
Status: DISCONTINUED | OUTPATIENT
Start: 2020-01-23 | End: 2020-01-26

## 2020-01-23 RX ADMIN — ACETAMINOPHEN 650 MG: 325 TABLET, FILM COATED ORAL at 02:43

## 2020-01-23 RX ADMIN — SODIUM CHLORIDE, PRESERVATIVE FREE 10 ML: 5 INJECTION INTRAVENOUS at 08:24

## 2020-01-23 RX ADMIN — LEVETIRACETAM 1000 MG: 500 SOLUTION ORAL at 08:23

## 2020-01-23 RX ADMIN — ACETAMINOPHEN 650 MG: 325 TABLET, FILM COATED ORAL at 23:05

## 2020-01-23 RX ADMIN — SODIUM CHLORIDE, PRESERVATIVE FREE 10 ML: 5 INJECTION INTRAVENOUS at 08:25

## 2020-01-23 RX ADMIN — MELATONIN TAB 5 MG 5 MG: 5 TAB at 21:36

## 2020-01-23 RX ADMIN — ENOXAPARIN SODIUM 40 MG: 40 INJECTION SUBCUTANEOUS at 00:02

## 2020-01-23 RX ADMIN — SODIUM CHLORIDE, PRESERVATIVE FREE 10 ML: 5 INJECTION INTRAVENOUS at 20:10

## 2020-01-23 RX ADMIN — FAMOTIDINE 20 MG: 20 TABLET ORAL at 08:24

## 2020-01-23 RX ADMIN — AMOXICILLIN AND CLAVULANATE POTASSIUM 1 TABLET: 875; 125 TABLET, FILM COATED ORAL at 21:36

## 2020-01-23 RX ADMIN — SODIUM CHLORIDE, PRESERVATIVE FREE 10 ML: 5 INJECTION INTRAVENOUS at 20:11

## 2020-01-23 RX ADMIN — IPRATROPIUM BROMIDE AND ALBUTEROL SULFATE 3 ML: .5; 3 SOLUTION RESPIRATORY (INHALATION) at 08:40

## 2020-01-23 RX ADMIN — BUPROPION HYDROCHLORIDE 150 MG: 150 TABLET, EXTENDED RELEASE ORAL at 08:47

## 2020-01-23 RX ADMIN — BUPROPION HYDROCHLORIDE 150 MG: 150 TABLET, EXTENDED RELEASE ORAL at 21:36

## 2020-01-23 RX ADMIN — ENOXAPARIN SODIUM 40 MG: 40 INJECTION SUBCUTANEOUS at 22:50

## 2020-01-23 RX ADMIN — TRAZODONE HYDROCHLORIDE 50 MG: 50 TABLET ORAL at 21:36

## 2020-01-23 RX ADMIN — Medication 1 CAPSULE: at 08:24

## 2020-01-23 RX ADMIN — FAMOTIDINE 20 MG: 20 TABLET ORAL at 17:31

## 2020-01-23 RX ADMIN — AMOXICILLIN AND CLAVULANATE POTASSIUM 1 TABLET: 875; 125 TABLET, FILM COATED ORAL at 08:23

## 2020-01-24 LAB
AMITRIP SERPL-MCNC: <20 NG/ML
ANION GAP SERPL CALCULATED.3IONS-SCNC: 12.5 MMOL/L (ref 5–15)
BACTERIA SPEC AEROBE CULT: NORMAL
BASOPHILS # BLD AUTO: 0.03 10*3/MM3 (ref 0–0.2)
BASOPHILS NFR BLD AUTO: 0.2 % (ref 0–1.5)
BUN BLD-MCNC: 12 MG/DL (ref 6–20)
BUN/CREAT SERPL: 15.8 (ref 7–25)
CALCIUM SPEC-SCNC: 8.8 MG/DL (ref 8.6–10.5)
CHLORIDE SERPL-SCNC: 104 MMOL/L (ref 98–107)
CO2 SERPL-SCNC: 23.5 MMOL/L (ref 22–29)
CREAT BLD-MCNC: 0.76 MG/DL (ref 0.76–1.27)
DEPRECATED RDW RBC AUTO: 43.8 FL (ref 37–54)
DESIPRAMINE SERPL-MCNC: <20 NG/ML
DOXEPIN SERPL-MCNC: <20 NG/ML
DOXEPIN+NOR SERPL-MCNC: <40 NG/ML (ref 50–150)
EOSINOPHIL # BLD AUTO: 0.17 10*3/MM3 (ref 0–0.4)
EOSINOPHIL NFR BLD AUTO: 1.3 % (ref 0.3–6.2)
ERYTHROCYTE [DISTWIDTH] IN BLOOD BY AUTOMATED COUNT: 12.3 % (ref 12.3–15.4)
GFR SERPL CREATININE-BSD FRML MDRD: 114 ML/MIN/1.73
GLUCOSE BLD-MCNC: 92 MG/DL (ref 65–99)
HCT VFR BLD AUTO: 41.5 % (ref 37.5–51)
HGB BLD-MCNC: 13.6 G/DL (ref 13–17.7)
IMIPRAMINE SERPL-MCNC: <20 NG/ML
IMIPRAMINE+DESIPR SERPL-MCNC: <40 NG/ML (ref 175–300)
IMM GRANULOCYTES # BLD AUTO: 0.89 10*3/MM3 (ref 0–0.05)
IMM GRANULOCYTES NFR BLD AUTO: 7 % (ref 0–0.5)
LYMPHOCYTES # BLD AUTO: 2.23 10*3/MM3 (ref 0.7–3.1)
LYMPHOCYTES NFR BLD AUTO: 17.5 % (ref 19.6–45.3)
Lab: ABNORMAL
MCH RBC QN AUTO: 31.8 PG (ref 26.6–33)
MCHC RBC AUTO-ENTMCNC: 32.8 G/DL (ref 31.5–35.7)
MCV RBC AUTO: 97 FL (ref 79–97)
MONOCYTES # BLD AUTO: 1.02 10*3/MM3 (ref 0.1–0.9)
MONOCYTES NFR BLD AUTO: 8 % (ref 5–12)
NEUTROPHILS # BLD AUTO: 8.43 10*3/MM3 (ref 1.7–7)
NEUTROPHILS NFR BLD AUTO: 66 % (ref 42.7–76)
NORDOXEPIN SERPL-MCNC: <20 NG/ML
NORTRIP SERPL-MCNC: <20 NG/ML (ref 50–150)
NRBC BLD AUTO-RTO: 0 /100 WBC (ref 0–0.2)
PLATELET # BLD AUTO: 307 10*3/MM3 (ref 140–450)
PMV BLD AUTO: 8.9 FL (ref 6–12)
POTASSIUM BLD-SCNC: 3.9 MMOL/L (ref 3.5–5.2)
RBC # BLD AUTO: 4.28 10*6/MM3 (ref 4.14–5.8)
SODIUM BLD-SCNC: 140 MMOL/L (ref 136–145)
TOTAL (AMI+NOR): <40 NG/ML (ref 80–200)
WBC NRBC COR # BLD: 12.77 10*3/MM3 (ref 3.4–10.8)

## 2020-01-24 PROCEDURE — 96125 COGNITIVE TEST BY HC PRO: CPT

## 2020-01-24 PROCEDURE — 94799 UNLISTED PULMONARY SVC/PX: CPT

## 2020-01-24 PROCEDURE — 25010000002 ENOXAPARIN PER 10 MG: Performed by: NURSE PRACTITIONER

## 2020-01-24 PROCEDURE — 99233 SBSQ HOSP IP/OBS HIGH 50: CPT | Performed by: NURSE PRACTITIONER

## 2020-01-24 PROCEDURE — 93010 ELECTROCARDIOGRAM REPORT: CPT | Performed by: INTERNAL MEDICINE

## 2020-01-24 PROCEDURE — 97110 THERAPEUTIC EXERCISES: CPT

## 2020-01-24 PROCEDURE — 93005 ELECTROCARDIOGRAM TRACING: CPT | Performed by: NURSE PRACTITIONER

## 2020-01-24 PROCEDURE — 85025 COMPLETE CBC W/AUTO DIFF WBC: CPT | Performed by: NURSE PRACTITIONER

## 2020-01-24 PROCEDURE — 97530 THERAPEUTIC ACTIVITIES: CPT

## 2020-01-24 PROCEDURE — 80048 BASIC METABOLIC PNL TOTAL CA: CPT | Performed by: NURSE PRACTITIONER

## 2020-01-24 RX ADMIN — SODIUM CHLORIDE, PRESERVATIVE FREE 10 ML: 5 INJECTION INTRAVENOUS at 08:40

## 2020-01-24 RX ADMIN — TRAZODONE HYDROCHLORIDE 50 MG: 50 TABLET ORAL at 21:11

## 2020-01-24 RX ADMIN — Medication 1 CAPSULE: at 08:38

## 2020-01-24 RX ADMIN — MELATONIN TAB 5 MG 5 MG: 5 TAB at 21:11

## 2020-01-24 RX ADMIN — AMOXICILLIN AND CLAVULANATE POTASSIUM 1 TABLET: 875; 125 TABLET, FILM COATED ORAL at 21:11

## 2020-01-24 RX ADMIN — SODIUM CHLORIDE, PRESERVATIVE FREE 10 ML: 5 INJECTION INTRAVENOUS at 20:55

## 2020-01-24 RX ADMIN — FAMOTIDINE 20 MG: 20 TABLET ORAL at 06:48

## 2020-01-24 RX ADMIN — BUPROPION HYDROCHLORIDE 150 MG: 150 TABLET, EXTENDED RELEASE ORAL at 08:38

## 2020-01-24 RX ADMIN — FAMOTIDINE 20 MG: 20 TABLET ORAL at 18:40

## 2020-01-24 RX ADMIN — SODIUM CHLORIDE, PRESERVATIVE FREE 10 ML: 5 INJECTION INTRAVENOUS at 20:54

## 2020-01-24 RX ADMIN — SODIUM CHLORIDE, PRESERVATIVE FREE 10 ML: 5 INJECTION INTRAVENOUS at 08:39

## 2020-01-24 RX ADMIN — SODIUM CHLORIDE, PRESERVATIVE FREE 10 ML: 5 INJECTION INTRAVENOUS at 08:41

## 2020-01-24 RX ADMIN — ENOXAPARIN SODIUM 40 MG: 40 INJECTION SUBCUTANEOUS at 23:46

## 2020-01-24 RX ADMIN — AMOXICILLIN AND CLAVULANATE POTASSIUM 1 TABLET: 875; 125 TABLET, FILM COATED ORAL at 08:38

## 2020-01-24 RX ADMIN — BUPROPION HYDROCHLORIDE 150 MG: 150 TABLET, EXTENDED RELEASE ORAL at 21:11

## 2020-01-25 LAB
ANION GAP SERPL CALCULATED.3IONS-SCNC: 13.7 MMOL/L (ref 5–15)
BASOPHILS # BLD AUTO: 0.03 10*3/MM3 (ref 0–0.2)
BASOPHILS NFR BLD AUTO: 0.2 % (ref 0–1.5)
BUN BLD-MCNC: 18 MG/DL (ref 6–20)
BUN/CREAT SERPL: 21.7 (ref 7–25)
CALCIUM SPEC-SCNC: 9.1 MG/DL (ref 8.6–10.5)
CHLORIDE SERPL-SCNC: 106 MMOL/L (ref 98–107)
CO2 SERPL-SCNC: 23.3 MMOL/L (ref 22–29)
CREAT BLD-MCNC: 0.83 MG/DL (ref 0.76–1.27)
DEPRECATED RDW RBC AUTO: 43.9 FL (ref 37–54)
EOSINOPHIL # BLD AUTO: 0.14 10*3/MM3 (ref 0–0.4)
EOSINOPHIL NFR BLD AUTO: 1 % (ref 0.3–6.2)
ERYTHROCYTE [DISTWIDTH] IN BLOOD BY AUTOMATED COUNT: 12.5 % (ref 12.3–15.4)
GFR SERPL CREATININE-BSD FRML MDRD: 103 ML/MIN/1.73
GLUCOSE BLD-MCNC: 128 MG/DL (ref 65–99)
HCT VFR BLD AUTO: 40.6 % (ref 37.5–51)
HGB BLD-MCNC: 13.7 G/DL (ref 13–17.7)
IMM GRANULOCYTES # BLD AUTO: 1.04 10*3/MM3 (ref 0–0.05)
IMM GRANULOCYTES NFR BLD AUTO: 7.2 % (ref 0–0.5)
LYMPHOCYTES # BLD AUTO: 2.34 10*3/MM3 (ref 0.7–3.1)
LYMPHOCYTES NFR BLD AUTO: 16.3 % (ref 19.6–45.3)
MCH RBC QN AUTO: 32.2 PG (ref 26.6–33)
MCHC RBC AUTO-ENTMCNC: 33.7 G/DL (ref 31.5–35.7)
MCV RBC AUTO: 95.3 FL (ref 79–97)
MONOCYTES # BLD AUTO: 1.02 10*3/MM3 (ref 0.1–0.9)
MONOCYTES NFR BLD AUTO: 7.1 % (ref 5–12)
NEUTROPHILS # BLD AUTO: 9.78 10*3/MM3 (ref 1.7–7)
NEUTROPHILS NFR BLD AUTO: 68.2 % (ref 42.7–76)
NRBC BLD AUTO-RTO: 0 /100 WBC (ref 0–0.2)
PLATELET # BLD AUTO: 366 10*3/MM3 (ref 140–450)
PMV BLD AUTO: 8.8 FL (ref 6–12)
POTASSIUM BLD-SCNC: 3.4 MMOL/L (ref 3.5–5.2)
POTASSIUM BLD-SCNC: 3.9 MMOL/L (ref 3.5–5.2)
RBC # BLD AUTO: 4.26 10*6/MM3 (ref 4.14–5.8)
SODIUM BLD-SCNC: 143 MMOL/L (ref 136–145)
WBC NRBC COR # BLD: 14.35 10*3/MM3 (ref 3.4–10.8)

## 2020-01-25 PROCEDURE — 93010 ELECTROCARDIOGRAM REPORT: CPT | Performed by: INTERNAL MEDICINE

## 2020-01-25 PROCEDURE — 87070 CULTURE OTHR SPECIMN AEROBIC: CPT | Performed by: INTERNAL MEDICINE

## 2020-01-25 PROCEDURE — 93005 ELECTROCARDIOGRAM TRACING: CPT | Performed by: NURSE PRACTITIONER

## 2020-01-25 PROCEDURE — 85025 COMPLETE CBC W/AUTO DIFF WBC: CPT | Performed by: NURSE PRACTITIONER

## 2020-01-25 PROCEDURE — 87205 SMEAR GRAM STAIN: CPT | Performed by: INTERNAL MEDICINE

## 2020-01-25 PROCEDURE — 99232 SBSQ HOSP IP/OBS MODERATE 35: CPT | Performed by: INTERNAL MEDICINE

## 2020-01-25 PROCEDURE — 80048 BASIC METABOLIC PNL TOTAL CA: CPT | Performed by: NURSE PRACTITIONER

## 2020-01-25 PROCEDURE — 84132 ASSAY OF SERUM POTASSIUM: CPT | Performed by: INTERNAL MEDICINE

## 2020-01-25 RX ADMIN — MELATONIN TAB 5 MG 5 MG: 5 TAB at 21:17

## 2020-01-25 RX ADMIN — SODIUM CHLORIDE, PRESERVATIVE FREE 10 ML: 5 INJECTION INTRAVENOUS at 08:34

## 2020-01-25 RX ADMIN — TRAZODONE HYDROCHLORIDE 50 MG: 50 TABLET ORAL at 21:17

## 2020-01-25 RX ADMIN — FAMOTIDINE 20 MG: 20 TABLET ORAL at 06:31

## 2020-01-25 RX ADMIN — FAMOTIDINE 20 MG: 20 TABLET ORAL at 17:31

## 2020-01-25 RX ADMIN — ACETAMINOPHEN 650 MG: 325 TABLET, FILM COATED ORAL at 21:17

## 2020-01-25 RX ADMIN — SODIUM CHLORIDE, PRESERVATIVE FREE 10 ML: 5 INJECTION INTRAVENOUS at 08:35

## 2020-01-25 RX ADMIN — SODIUM CHLORIDE, PRESERVATIVE FREE 10 ML: 5 INJECTION INTRAVENOUS at 21:18

## 2020-01-25 RX ADMIN — SODIUM CHLORIDE, PRESERVATIVE FREE 10 ML: 5 INJECTION INTRAVENOUS at 21:17

## 2020-01-25 RX ADMIN — Medication 1 CAPSULE: at 08:33

## 2020-01-25 RX ADMIN — AMOXICILLIN AND CLAVULANATE POTASSIUM 1 TABLET: 875; 125 TABLET, FILM COATED ORAL at 21:17

## 2020-01-25 RX ADMIN — BUPROPION HYDROCHLORIDE 150 MG: 150 TABLET, EXTENDED RELEASE ORAL at 08:33

## 2020-01-25 RX ADMIN — AMOXICILLIN AND CLAVULANATE POTASSIUM 1 TABLET: 875; 125 TABLET, FILM COATED ORAL at 08:33

## 2020-01-25 RX ADMIN — BUPROPION HYDROCHLORIDE 150 MG: 150 TABLET, EXTENDED RELEASE ORAL at 21:17

## 2020-01-26 VITALS
RESPIRATION RATE: 14 BRPM | TEMPERATURE: 97.7 F | BODY MASS INDEX: 21.1 KG/M2 | OXYGEN SATURATION: 99 % | DIASTOLIC BLOOD PRESSURE: 73 MMHG | WEIGHT: 155.8 LBS | HEART RATE: 96 BPM | HEIGHT: 72 IN | SYSTOLIC BLOOD PRESSURE: 115 MMHG

## 2020-01-26 PROBLEM — M62.82 NON-TRAUMATIC RHABDOMYOLYSIS: Status: RESOLVED | Noted: 2020-01-26 | Resolved: 2020-01-26

## 2020-01-26 PROBLEM — M62.82 NON-TRAUMATIC RHABDOMYOLYSIS: Status: ACTIVE | Noted: 2020-01-26

## 2020-01-26 PROBLEM — T50.902A INTENTIONAL DRUG OVERDOSE (HCC): Status: ACTIVE | Noted: 2020-01-16

## 2020-01-26 PROBLEM — G93.1 ANOXIC BRAIN DAMAGE (HCC): Status: ACTIVE | Noted: 2020-01-26

## 2020-01-26 PROBLEM — N17.9 ACUTE KIDNEY INJURY (HCC): Status: RESOLVED | Noted: 2020-01-26 | Resolved: 2020-01-26

## 2020-01-26 PROBLEM — A41.9 SEPSIS WITHOUT ACUTE ORGAN DYSFUNCTION (HCC): Status: RESOLVED | Noted: 2020-01-26 | Resolved: 2020-01-26

## 2020-01-26 PROBLEM — A41.9 SEPSIS WITHOUT ACUTE ORGAN DYSFUNCTION (HCC): Status: ACTIVE | Noted: 2020-01-26

## 2020-01-26 PROBLEM — R77.8 ELEVATED TROPONIN: Status: ACTIVE | Noted: 2020-01-26

## 2020-01-26 PROBLEM — N17.9 ACUTE KIDNEY INJURY (HCC): Status: ACTIVE | Noted: 2020-01-26

## 2020-01-26 PROBLEM — G93.41 METABOLIC ENCEPHALOPATHY: Status: ACTIVE | Noted: 2020-01-26

## 2020-01-26 PROBLEM — R77.8 ELEVATED TROPONIN: Status: RESOLVED | Noted: 2020-01-26 | Resolved: 2020-01-26

## 2020-01-26 PROBLEM — J69.0 ASPIRATION PNEUMONIA OF RIGHT LOWER LOBE (HCC): Status: ACTIVE | Noted: 2020-01-26

## 2020-01-26 LAB
ANION GAP SERPL CALCULATED.3IONS-SCNC: 12.3 MMOL/L (ref 5–15)
BASOPHILS # BLD AUTO: 0.04 10*3/MM3 (ref 0–0.2)
BASOPHILS NFR BLD AUTO: 0.4 % (ref 0–1.5)
BUN BLD-MCNC: 15 MG/DL (ref 6–20)
BUN/CREAT SERPL: 22.1 (ref 7–25)
CALCIUM SPEC-SCNC: 9.3 MG/DL (ref 8.6–10.5)
CHLORIDE SERPL-SCNC: 105 MMOL/L (ref 98–107)
CO2 SERPL-SCNC: 22.7 MMOL/L (ref 22–29)
CREAT BLD-MCNC: 0.68 MG/DL (ref 0.76–1.27)
DEPRECATED RDW RBC AUTO: 43.3 FL (ref 37–54)
EOSINOPHIL # BLD AUTO: 0.13 10*3/MM3 (ref 0–0.4)
EOSINOPHIL NFR BLD AUTO: 1.1 % (ref 0.3–6.2)
ERYTHROCYTE [DISTWIDTH] IN BLOOD BY AUTOMATED COUNT: 12.5 % (ref 12.3–15.4)
GFR SERPL CREATININE-BSD FRML MDRD: 129 ML/MIN/1.73
GLUCOSE BLD-MCNC: 103 MG/DL (ref 65–99)
HCT VFR BLD AUTO: 41.5 % (ref 37.5–51)
HGB BLD-MCNC: 13.9 G/DL (ref 13–17.7)
IMM GRANULOCYTES # BLD AUTO: 1 10*3/MM3 (ref 0–0.05)
IMM GRANULOCYTES NFR BLD AUTO: 8.8 % (ref 0–0.5)
LYMPHOCYTES # BLD AUTO: 1.85 10*3/MM3 (ref 0.7–3.1)
LYMPHOCYTES NFR BLD AUTO: 16.3 % (ref 19.6–45.3)
MCH RBC QN AUTO: 32 PG (ref 26.6–33)
MCHC RBC AUTO-ENTMCNC: 33.5 G/DL (ref 31.5–35.7)
MCV RBC AUTO: 95.6 FL (ref 79–97)
MONOCYTES # BLD AUTO: 0.95 10*3/MM3 (ref 0.1–0.9)
MONOCYTES NFR BLD AUTO: 8.4 % (ref 5–12)
NEUTROPHILS # BLD AUTO: 7.4 10*3/MM3 (ref 1.7–7)
NEUTROPHILS NFR BLD AUTO: 65 % (ref 42.7–76)
NRBC BLD AUTO-RTO: 0 /100 WBC (ref 0–0.2)
PLATELET # BLD AUTO: 348 10*3/MM3 (ref 140–450)
PMV BLD AUTO: 8.5 FL (ref 6–12)
POTASSIUM BLD-SCNC: 4.1 MMOL/L (ref 3.5–5.2)
RBC # BLD AUTO: 4.34 10*6/MM3 (ref 4.14–5.8)
SODIUM BLD-SCNC: 140 MMOL/L (ref 136–145)
WBC NRBC COR # BLD: 11.37 10*3/MM3 (ref 3.4–10.8)

## 2020-01-26 PROCEDURE — 99239 HOSP IP/OBS DSCHRG MGMT >30: CPT | Performed by: INTERNAL MEDICINE

## 2020-01-26 PROCEDURE — 85025 COMPLETE CBC W/AUTO DIFF WBC: CPT | Performed by: NURSE PRACTITIONER

## 2020-01-26 PROCEDURE — 80048 BASIC METABOLIC PNL TOTAL CA: CPT | Performed by: NURSE PRACTITIONER

## 2020-01-26 PROCEDURE — 93005 ELECTROCARDIOGRAM TRACING: CPT | Performed by: NURSE PRACTITIONER

## 2020-01-26 PROCEDURE — 93010 ELECTROCARDIOGRAM REPORT: CPT | Performed by: INTERNAL MEDICINE

## 2020-01-26 RX ORDER — CHOLECALCIFEROL (VITAMIN D3) 125 MCG
10 CAPSULE ORAL NIGHTLY
Status: DISCONTINUED | OUTPATIENT
Start: 2020-01-26 | End: 2020-01-26 | Stop reason: HOSPADM

## 2020-01-26 RX ORDER — FAMOTIDINE 20 MG/1
20 TABLET, FILM COATED ORAL
Start: 2020-01-26

## 2020-01-26 RX ORDER — RISPERIDONE 1 MG/1
1 TABLET ORAL NIGHTLY
Status: DISCONTINUED | OUTPATIENT
Start: 2020-01-26 | End: 2020-01-26 | Stop reason: HOSPADM

## 2020-01-26 RX ORDER — CHOLECALCIFEROL (VITAMIN D3) 125 MCG
10 CAPSULE ORAL NIGHTLY
Start: 2020-01-26

## 2020-01-26 RX ORDER — RISPERIDONE 1 MG/1
1 TABLET ORAL NIGHTLY
Start: 2020-01-26

## 2020-01-26 RX ADMIN — SODIUM CHLORIDE, PRESERVATIVE FREE 10 ML: 5 INJECTION INTRAVENOUS at 09:31

## 2020-01-26 RX ADMIN — BUPROPION HYDROCHLORIDE 150 MG: 150 TABLET, EXTENDED RELEASE ORAL at 09:29

## 2020-01-26 RX ADMIN — FAMOTIDINE 20 MG: 20 TABLET ORAL at 09:29

## 2020-01-26 RX ADMIN — Medication 1 CAPSULE: at 09:29

## 2020-01-27 LAB
BACTERIA SPEC RESP CULT: NORMAL
GRAM STN SPEC: NORMAL

## 2020-02-04 NOTE — PAYOR COMM NOTE
"Abelino Austin (39 y.o. Male)   ATTN: NANI DIGGS CROSS BLUE VILCHIS FAX# [phone]  REF: [id number]  RE: REQUESTED CLINICALS AND DISCHARGE SUMMARY     Joanna Soto  Utilization Review/Room Reservations  Phone:Gskosbcy-[phone] ,Kyung/Cvuifq-[phone], [phone], [phone] or [phone]  Fax: [phone]  Email: [email or web address]  Please call, fax back, or email with authorization or any questions! Thanks!    Date of Birth Social Security Number Address Home Phone MRN    1980  [address] [address] [phone] [id number]    Anabaptist Marital Status          Islam        Admission Date Admission Type Admitting Provider Attending Provider Department, Room/Bed    1/16/20 Emergency Ian Aaron MD  [facility] MED SURG, 1416/1    Discharge Date Discharge Disposition Discharge Destination        1/26/2020 Psychiatric Hospital or Unit (DC - External)              Attending Provider:  (none)   Allergies:  No Known Allergies    Isolation:  None   Infection:  None   Code Status:  Prior    Ht:  182.9 cm (72\")   Wt:  70.7 kg (155 lb 12.8 oz)    Admission Cmt:  None   Principal Problem:  Intentional drug overdose (CMS/[facility]) [T50.902A]                 Active Insurance as of 1/16/2020     Primary Coverage     Payor Plan Insurance Group Employer/Plan Group    [employer]HEM BLUE CROSS AN[employer]M BLUE CROSS BLUE [employer]IELD PPO [id number]     Payor Plan Address Payor Plan Phone Number Payor Plan Fax Number Effective Dates    [address] [phone]  12/17/2017 - None Entered    [facility] [id number]       Subscriber Name Subscriber Birth Date Member ID       ABELINO AUSTIN 1980 [id number]                 Emergency Contacts      (Rel.) Home Phone Work Phone Mobile Phone    Dahiana Rose (Mother) [phone] -- --            Physician Progress Notes (last 7 days) (Notes from 01/28/20 0841 through 02/04/20 0841)    No notes " "of this type exist for this encounter.         Consult Notes (last 7 days) (Notes from 01/28/20 through 02/04/20)    No notes of this type exist for this encounter.            Discharge Summary      Ambrose Richards MD at 01/26/20 0952            Abelino Austin  1980  [id number]      Hospitalists Discharge Summary    Date of Admission: 1/16/2020  Date of Discharge:  1/26/2020    Primary Discharge Diagnoses: Intentional Overdose probably from Tramadol    Secondary Discharge Diagnoses:   Sepsis w/ HAGMA from Aspiration PNA  Witnessed Grand Mal Seizure from Drug Overdose  NSTEMII from demand ischemia  Acute Rhabdomyolysis from being found down  ADELITA w/ Hematuria  Transaminitis  Severe cognitive deficits from very likely Anoxic Brain Injury  Metabolic and likely Toxic Encephalopathy Present on admission that resolved with New onset Sundowing delirium  Anxiety & Depression  Normocytic Anemia and thrombocytopenia  H/o of Suicide attempt by hanging    PCP  Patient Care Team:  Provider, No Known as PCP - General    Consults:   Consults     Date and Time Order Name Status Description    1/18/2020 1412 Inpatient Infectious Diseases Consult Completed     1/17/2020 1340 Inpatient Cardiology Consult      1/16/2020 2235 Inpatient Neurology Consult General Completed     1/16/2020 2235 Inpatient Pulmonology Consult Completed           CC:  Found Down    History of Present Illness:  As per H&P: \"Mr. Austin is intubated and sedated.  The HPI is taken from family that accompany him.  This is a 38 y/o  male who was found down by his ex-wife (with whom he resides) after work.  He has been in his normal state of health, but he has been more depressed recently as he has been out-of-work due to shoulder complaints and recent surgeries.  The ex-wife reports that despite this, he had a really good evening w/ their children.  He was playing cards with them last night before bed.  His father reports that he spoke with " "his son several times throughout the day yesterday, and he felt like everything was normal.  His father has attempted suicide in the past and felt as though he would recognize \"warning signs\" given off by his son.  This morning, his ex-wife got up for work and he was still sleeping.  Although he does sleep in a different room, this was not unusual.  She tried texting him throughout the morning, but did not receive a response.  She called him at 12:45, and he did not answer or call her back.  She asked their daughter to check on him when she returned home from school  The daughter called and said he was sleeping.  This was not unusual as he liked to nap.  However, when the ex-wife came home, she could not wake him up.  She notes he was drooling, but wasn't choking on his secretions.  EMS was summoned.     He received multiple doses of Narcan w/o change.  The ex-wife reports that her medicine bottles appeared intact.  He was intubated in the ER for airway protection.  He has attempted suicide in the past by hanging himself.  One of these episodes was brought on by [facility].\"    Hospital Course    Pt was exceedingly difficult to extubate due to agitation due to metabolic/toxic presumed from the overdose, though substance withdrawal can not be ruled out.  Best guess as to substance was tramadol overdose.  Sometimes on exam patient would report intention to hurt himself, and that his overdose was intentional, other times he is guarded and denies intent to harm himself.  Is currently interested in getting rehabilitation and help to recover from both his psychiatric and his physical ailments.    He had witnessed seizures while intubated, felt by neurology to be from his overdose, and giving supportive evidence for tramadol overdose.  He had been on Keppra earlier this admission due to the seizure, but stopped on 1/23 by neurology, who states he does not need further Keppra at this point.    Elevated troponin cardiology was " consulted, no evidence of ischemia felt to be demand in the setting of overdose and rhabdomyolysis with acute kidney injury.    Last night nurses reported some agitation where patient wandered down to the end of the michaels, appeared confused and disoriented.  Able to be redirected back to bed.  This morning patient does not remember the event.  Likely a sundowning type related delirium related to his underlying brain injury from anoxia.  Patient has taken Risperdal in the past, though he does not remember the indication nor the dose.  Recommend starting Risperdal 1 mg at night for sundowning, if other antipsychotics are not prescribed for other psychiatric indications.  Otherwise have increased his melatonin to 10 mg nightly for delirium prophylaxis.    Patient's acute medical illnesses were treated and resolved while the patient was intubated.  Currently his aspiration pneumonia is being treated by [facility] with good response.  Patient reports cough with sputum, which may continue for at least 6 to 8 weeks.  Respiratory culture from the 18th showed normal donaldo, due to continued sputum and slight rise in white count yesterday sputum was recultured, no growth to date.  White count then continued to resolve today.  Patient shows no other symptoms, has been afebrile.    Plan is for pt to sign himself in voluntarily to [facility], transported by car w/ hospital staff member with escort.     Physical Exam at Discharge  Vital Signs  Temp:  [97.8 °F (36.6 °C)-98.9 °F (37.2 °C)] 98.5 °F (36.9 °C)  Heart Rate:  [] 85  Resp:  [16-20] 20  BP: (108-123)/(70-78) 108/71    Physical Exam:  Physical Exam   Constitutional: He is oriented to person, place, and time. No distress.   HENT:   Mouth/Throat: Oropharynx is clear and moist. No oropharyngeal exudate.   Eyes: Pupils are equal, round, and reactive to light. Conjunctivae and EOM are normal.   Neck: No JVD present.   Cardiovascular: Normal rate, regular rhythm  and normal heart sounds. Exam reveals no friction rub.   No murmur heard.  Pulmonary/Chest: Effort normal and breath sounds normal. No stridor. No respiratory distress. He has no wheezes. He has no rales.   Abdominal: Soft. Bowel sounds are normal. He exhibits no distension. There is no tenderness. There is no guarding.   Musculoskeletal: He exhibits no edema.   Neurological: He is alert and oriented to person, place, and time. No cranial nerve deficit.   Skin: Skin is warm and dry. He is not diaphoretic. No erythema.   Psychiatric:   Deferred mood, flattened guarded affect   Nursing note and vitals reviewed.      Operations and Procedures Performed:        Allergies:  has No Known Allergies.    Edward      Discharge Medications:     Discharge Medications      New Medications      Instructions Start Date   famotidine 20 MG tablet  Commonly known as:  PEPCID   20 mg, Oral, 2 Times Daily Before Meals      melatonin 5 MG tablet tablet   10 mg, Oral, Nightly         Changes to Medications      Instructions Start Date   risperiDONE 1 MG tablet  Commonly known as:  risperDAL  What changed:    · medication strength  · how much to take  · when to take this   1 mg, Oral, Nightly         Continue These Medications      Instructions Start Date   buPROPion  MG 12 hr tablet  Commonly known as:  WELLBUTRIN SR   150 mg, Oral, 2 Times Daily      TRAZODONE HCL PO   Oral         Stop These Medications    citalopram 10 MG tablet  Commonly known as:  CeleXA     meloxicam 15 MG tablet  Commonly known as:  MOBIC     metaxalone 800 MG tablet  Commonly known as:  SKELAXIN     ondansetron 4 MG tablet  Commonly known as:  ZOFRAN     oxybutynin 5 MG tablet  Commonly known as:  DITROPAN     tiZANidine 4 MG tablet  Commonly known as:  ZANAFLEX     traMADol 50 MG tablet  Commonly known as:  ULTRAM            Last Lab Results:   Lab Results (last 72 hours)     Procedure Component Value Units Date/Time    Basic Metabolic Panel [[id number]]   (Abnormal) Collected:  01/26/20 0640    Specimen:  Blood Updated:  01/26/20 0714     Glucose 103 mg/dL      BUN 15 mg/dL      Creatinine 0.68 mg/dL      Sodium 140 mmol/L      Potassium 4.1 mmol/L      Chloride 105 mmol/L      CO2 22.7 mmol/L      Calcium 9.3 mg/dL      eGFR Non African Amer 129 mL/min/1.73      BUN/Creatinine Ratio 22.1     Anion Gap 12.3 mmol/L     Narrative:       GFR Normal >60  Chronic Kidney Disease <60  Kidney Failure <15      CBC & Differential [[id number]] Collected:  01/26/20 0640    Specimen:  Blood Updated:  01/26/20 0645    Narrative:       The following orders were created for panel order CBC & Differential.  Procedure                               Abnormality         Status                     ---------                               -----------         ------                     CBC Auto Differential[[id number]]        Abnormal            Final result                 Please view results for these tests on the individual orders.    CBC Auto Differential [[id number]]  (Abnormal) Collected:  01/26/20 0640    Specimen:  Blood Updated:  01/26/20 0645     WBC 11.37 10*3/mm3      RBC 4.34 10*6/mm3      Hemoglobin 13.9 g/dL      Hematocrit 41.5 %      MCV 95.6 fL      MCH 32.0 pg      MCHC 33.5 g/dL      RDW 12.5 %      RDW-SD 43.3 fl      MPV 8.5 fL      Platelets 348 10*3/mm3      Neutrophil % 65.0 %      Lymphocyte % 16.3 %      Monocyte % 8.4 %      Eosinophil % 1.1 %      Basophil % 0.4 %      Immature Grans % 8.8 %      Neutrophils, Absolute 7.40 10*3/mm3      Lymphocytes, Absolute 1.85 10*3/mm3      Monocytes, Absolute 0.95 10*3/mm3      Eosinophils, Absolute 0.13 10*3/mm3      Basophils, Absolute 0.04 10*3/mm3      Immature Grans, Absolute 1.00 10*3/mm3      nRBC 0.0 /100 WBC     Potassium [[id number]]  (Normal) Collected:  01/25/20 1848    Specimen:  Blood Updated:  01/25/20 1903     Potassium 3.9 mmol/L     Respiratory Culture - Sputum, Throat [[id number]] Collected:  01/25/20 1414     Specimen:  Sputum from Throat Updated:  01/25/20 1456     Gram Stain Few (2+) WBCs seen      Rare (1+) Epithelial cells seen      Rare (1+) Gram positive cocci in pairs and chains    Basic Metabolic Panel [[id number]]  (Abnormal) Collected:  01/25/20 0450    Specimen:  Blood Updated:  01/25/20 0515     Glucose 128 mg/dL      BUN 18 mg/dL      Creatinine 0.83 mg/dL      Sodium 143 mmol/L      Potassium 3.4 mmol/L      Chloride 106 mmol/L      CO2 23.3 mmol/L      Calcium 9.1 mg/dL      eGFR Non African Amer 103 mL/min/1.73      BUN/Creatinine Ratio 21.7     Anion Gap 13.7 mmol/L     Narrative:       GFR Normal >60  Chronic Kidney Disease <60  Kidney Failure <15      CBC & Differential [[id number]] Collected:  01/25/20 0450    Specimen:  Blood Updated:  01/25/20 0501    Narrative:       The following orders were created for panel order CBC & Differential.  Procedure                               Abnormality         Status                     ---------                               -----------         ------                     CBC Auto Differential[[id number]]        Abnormal            Final result                 Please view results for these tests on the individual orders.    CBC Auto Differential [[id number]]  (Abnormal) Collected:  01/25/20 0450    Specimen:  Blood Updated:  01/25/20 0501     WBC 14.35 10*3/mm3      RBC 4.26 10*6/mm3      Hemoglobin 13.7 g/dL      Hematocrit 40.6 %      MCV 95.3 fL      MCH 32.2 pg      MCHC 33.7 g/dL      RDW 12.5 %      RDW-SD 43.9 fl      MPV 8.8 fL      Platelets 366 10*3/mm3      Neutrophil % 68.2 %      Lymphocyte % 16.3 %      Monocyte % 7.1 %      Eosinophil % 1.0 %      Basophil % 0.2 %      Immature Grans % 7.2 %      Neutrophils, Absolute 9.78 10*3/mm3      Lymphocytes, Absolute 2.34 10*3/mm3      Monocytes, Absolute 1.02 10*3/mm3      Eosinophils, Absolute 0.14 10*3/mm3      Basophils, Absolute 0.03 10*3/mm3      Immature Grans, Absolute 1.04 10*3/mm3      nRBC 0.0 /100  WBC     Tricyclic Antidepressants [[id number]]  (Abnormal) Collected:  01/23/20 1357    Specimen:  Blood Updated:  01/24/20 2306     Amitriptyline <20 ng/mL      Nortriptyline <20 ng/mL      Total (Ami+Nor) <40 ng/mL      Comment:                                 Detection Limit = 20        Imipramine <20 ng/mL      Desipramine <20 ng/mL      Imipramine + Desipramine Total <40 ng/mL      Comment:                                 Detection Limit = 20        Doxepin <20 ng/mL      Nordoxepin <20 ng/mL      Doxepin+Nordoxepin <40 ng/mL      Comment:                                 Detection Limit = 20        Disclaimer: Comment     Comment: This test was developed and its performance characteristics  determined by [facility]. It has not been cleared or approved  by the Food and Drug Administration.       Narrative:       Performed at:  [address]  [id number]  : Daryl Campa MD, Phone:  [phone]    Blood Culture - Blood, Arm, Left [[id number]] Collected:  01/19/20 1811    Specimen:  Blood from Arm, Left Updated:  01/24/20 1831     Blood Culture No growth at 5 days    Basic Metabolic Panel [[id number]]  (Normal) Collected:  01/24/20 0456    Specimen:  Blood Updated:  01/24/20 0601     Glucose 92 mg/dL      BUN 12 mg/dL      Creatinine 0.76 mg/dL      Sodium 140 mmol/L      Potassium 3.9 mmol/L      Chloride 104 mmol/L      CO2 23.5 mmol/L      Calcium 8.8 mg/dL      eGFR Non African Amer 114 mL/min/1.73      BUN/Creatinine Ratio 15.8     Anion Gap 12.5 mmol/L     Narrative:       GFR Normal >60  Chronic Kidney Disease <60  Kidney Failure <15      CBC & Differential [[id number]] Collected:  01/24/20 0456    Specimen:  Blood Updated:  01/24/20 0548    Narrative:       The following orders were created for panel order CBC & Differential.  Procedure                               Abnormality         Status                     ---------                                -----------         ------                     CBC Auto Differential[[id number]]        Abnormal            Final result                 Please view results for these tests on the individual orders.    CBC Auto Differential [[id number]]  (Abnormal) Collected:  01/24/20 0456    Specimen:  Blood Updated:  01/24/20 0548     WBC 12.77 10*3/mm3      RBC 4.28 10*6/mm3      Hemoglobin 13.6 g/dL      Hematocrit 41.5 %      MCV 97.0 fL      MCH 31.8 pg      MCHC 32.8 g/dL      RDW 12.3 %      RDW-SD 43.8 fl      MPV 8.9 fL      Platelets 307 10*3/mm3      Neutrophil % 66.0 %      Lymphocyte % 17.5 %      Monocyte % 8.0 %      Eosinophil % 1.3 %      Basophil % 0.2 %      Immature Grans % 7.0 %      Neutrophils, Absolute 8.43 10*3/mm3      Lymphocytes, Absolute 2.23 10*3/mm3      Monocytes, Absolute 1.02 10*3/mm3      Eosinophils, Absolute 0.17 10*3/mm3      Basophils, Absolute 0.03 10*3/mm3      Immature Grans, Absolute 0.89 10*3/mm3      nRBC 0.0 /100 WBC         Xr Hand 2 View Right    Result Date: 1/17/2020  Narrative: XR HAND 2 VW RIGHT-: 1/17/2020 11:11 AM  INDICATION: And swelling since yesterday.  COMPARISON: None available.  FINDINGS: 3 views of the right hand.  No fracture or dislocation. No bone erosion or destruction. There is an electronic monitor on the second finger      Impression: Negative right hand.  This report was finalized on 1/17/2020 11:24 AM by Dr. Porter Lopez MD.      Ct Head Without Contrast    Result Date: 1/16/2020  Narrative: CT HEAD, NONCONTRAST, 1/16/2020 HISTORY: 39-year-old male in the ED with acutely altered mental status. Possible drug overdose. TECHNIQUE: CT imaging of the head without IV contrast. Radiation dose reduction techniques included automated exposure control. Radiation audit for CT and nuclear cardiology exams in the last 12 months: 0. FINDINGS: The images are markedly degraded by severe patient motion artifact. He moved throughout the examination. The entire study was repeated  without significant improvement, and both sets of images are reviewed. No gross evidence of acute intracranial abnormality. No visible mass, mass effect, cerebral edema, hydrocephalus or large intracranial hemorrhage. No visible skull fracture.     Impression: 1.  Technically limited study due to severe patient motion artifact. Consider repeating the study when motionless imaging is clinically feasible. 2.  The examination is grossly negative for acute intracranial abnormality. Signer Name: Prabhjot Celeste MD  Signed: 1/16/2020 6:45 PM  Workstation [facility]: [facility]  [facility]    Xr Chest 1 View    Result Date: 1/22/2020  Narrative: CHEST X-RAY, 01/22/2020     HISTORY: 39-year-old male hospital inpatient with metabolic encephalopathy and a nontoxic encephalopathy following reported medication overdose. Post extubation image.  TECHNIQUE: AP portable chest x-ray.  COMPARISON: *  Chest x-ray, 01/21/2020.  FINDINGS: The patient has been extubated since yesterday. There is improved overall lung expansion. Patchy airspace infiltrate remains present in the medial right lower lobe, but this is improved since yesterday. Probable tiny right pleural effusion.  Feeding tube is been removed. Right arm PICC remains in good position.      Impression: 1.  Improved overall lung expansion following extubation since yesterday. 2.  Persistent airspace infiltrate in the posteromedial right lower lobe, slightly improved. 3.  Suspected tiny right pleural effusion. 4.  PICC in good position.  This report was finalized on 1/22/2020 8:41 AM by Dr. Prabhjot Celeste MD.      Xr Chest 1 View    Result Date: 1/21/2020  Narrative: CHEST ONE VIEW 01/21/2020  HISTORY: Respiratory failure, intubated, follow-up infiltrates.  FINDINGS: The cardiac and mediastinal structures are stable compared with 01/20/2020. There is been no change in the position of the life support equipment. Poor inspiratory result with slight  increase in the infiltrate at the right lung base. Infiltrate at the medial left base is unchanged. The upper lungs are clear. There are no pleural effusions.      Impression: Slight increase in the right lower lobe infiltrate compared with 01/20/2020. No other change.  This report was finalized on 1/21/2020 8:21 AM by Dr. Trey Isaacs MD.      Xr Chest 1 View    Result Date: 1/20/2020  Narrative: CR Chest 1 Vw INDICATION: Respiratory failure COMPARISON:  1/19/2020 FINDINGS: Single portable AP view(s) of the chest. Support lines and tubes are unchanged. The heart and mediastinum have a normal configuration. Perihilar infiltrates seen on the previous examination show partial clearing. Consolidation is greater on the right than on the left. No new infiltrates or enlarging effusions are seen. Vascular markings are within normal limits.     Impression: Bilateral perihilar infiltrates show partial clearing since the previous examination. Signer Name: Dinh Tavarez MD  Signed: 1/20/2020 6:42 AM  [facility]station [facility]: [facility]  [facility]    Xr Chest 1 View    Result Date: 1/19/2020  Narrative: CR Chest 1 Vw INDICATION: Dobbhoff tube placement COMPARISON:  Earlier the same day FINDINGS: Single portable AP view(s) of the chest. Dobbhoff tube is been advanced. The tip is now in the stomach. The PIC catheter and endotracheal tube in good position. Bilateral infiltrates are stable Signer Name: Porter Lopez MD  Signed: 1/19/2020 12:32 PM  Workstation Name: [facility]  [facility]    Xr Chest 1 View    Result Date: 1/19/2020  Narrative: CR Chest 1 Vw INDICATION: Evaluate Dobbhoff tube placement COMPARISON:  Earlier the same day FINDINGS: Single portable AP view(s) of the chest. PIC catheter and endotracheal tube remain in good position. The Dobbhoff tube is been added. The tube is coiled back on itself in the distal esophagus. The overlapping portion is about 4 cm long. Bilateral  perihilar infiltrates have increased since 5:49 AM .. The heart size normal     Impression: The Dobbhoff tube is been back on itself 4 cm from the end of the tube in the distal portion of the tube lies in the distal esophagus. Increasing bilateral perihilar infiltrates Signer Name: Porter Lopez MD  Signed: 1/19/2020 11:46 AM  Workstation [facility]: [facility]    Xr Chest 1 View    Result Date: 1/19/2020  Narrative: CR Chest 1 Vw INDICATION: 39-year-old male with respiratory failure and pneumonia. COMPARISON:  1/18/2020 FINDINGS: Single portable AP view(s) of the chest. ET tube tip in good position above the indu. Right-sided PICC line unchanged. The cardiac silhouette is stable. Stable to slight improvement in perihilar opacities bilaterally. No new dense consolidation effusion or pneumothorax.     Impression: 1. Tubes and lines in satisfactory position. No pneumothorax. 2. Stable to slight interval improvement in the appearance of the chest. Signer Name: Jose Guadalupe Patrick MD  Signed: 1/19/2020 6:36 AM  Workstation Name: [facility]  [facility]    Xr Chest 1 View    Result Date: 1/18/2020  Narrative: CR Chest 1 Vw INDICATION: 39-year-old male with pneumonia and respiratory failure. COMPARISON:  1/17/2020 FINDINGS: Single portable AP view(s) of the chest. ET tube tip in good position above the indu. Right-sided PICC line present and terminates at the distal SVC level. Cardiac silhouette within normal limits. Unremarkable vascularity. Stable to interval slight worsening of left perihilar and right perihilar and lower lung zone opacities. No new effusion or pneumothorax.     Impression: 1. Tubes and lines in satisfactory position. No pneumothorax. 2. Stable to slight interval worsening appearance of the chest. Signer Name: Jose Guadalupe Patrick MD  Signed: 1/18/2020 6:39 AM  Workstation Name: [facility]  [facility]    Xr Chest 1 View    Result  Date: 1/17/2020  Narrative: CR Chest 1 Vw 1/17/2020 INDICATION: Respiratory failure, intubated today, follow-up infiltrates. COMPARISON:  1/16/2020 FINDINGS: Single portable AP view(s) of the chest. Endotracheal tube tip is approximately 4 cm above the indu. The heart is normal in size. There is poor inspiratory result and elevation the right hemidiaphragm with no significant change in the bibasilar infiltrates. There are no pleural effusions. No pneumothorax.     Impression: Endotracheal tube tip 4 cm above the indu. Stable bilateral infiltrates compared with 1/16/2020. Signer Name: Trey Isaacs MD  Signed: 1/17/2020 4:38 PM  Workstation Name: [employer]  [facility]    Xr Chest 1 View    Result Date: 1/16/2020  Narrative: CHEST X-RAY, 1/16/2020 (21:12)  HISTORY:  39-year-old male intubated in the ED. Reassess ETT position.  TECHNIQUE: AP portable chest x-ray.  FINDINGS: Endotracheal tube tip in the lower thoracic trachea about 1.8 cm above the indu. Increasingly dense airspace consolidation in the posterior right lower lobe and milder infiltrate in the posterior left lower lobe. Consider developing aspiration pneumonitis. Upper lungs clear. No pleural effusion.     Impression: 1. ETT tip in the lower thoracic trachea about 1.8 cm above the indu. 2. Increasing basilar infiltrates. Signer Name: Prabhjot Celeste MD  Signed: 1/16/2020 9:31 PM  Workstation [facility]: [facility]  [facility]    Xr Chest 1 View    Result Date: 1/16/2020  Narrative: CHEST X-RAY, 1/16/2020 (19:19)  HISTORY:  Endotracheal tube placement.  TECHNIQUE: AP portable supine chest x-ray.  FINDINGS: Newly placed endotracheal tube is in good position with tip in the mid thoracic trachea about 4.0 cm above the indu. Increasing bibasilar pulmonary infiltrates, greater on the right. Consider developing aspiration pneumonitis.     Impression: 1. ETT in good position. 2. Increasing basilar  pulmonary infiltrates, greater on the right. Signer Name: Prabhjot Celeste MD  Signed: 1/16/2020 7:44 PM  Workstation Name: [facility]  [facility]    Xr Chest 1 View    Result Date: 1/16/2020  Narrative: CHEST X-RAY, 1/16/2020  HISTORY:  39-year-old male in the ED with acute mental status changes, unresponsive. Possible overdose.  TECHNIQUE: AP portable supine chest x-ray.  FINDINGS: Mild hazy infiltrate in the right infrahilar region. Consider early aspiration pneumonitis in the current clinical setting. The lungs are otherwise clear. Cardiomediastinal silhouette is normal, and pulmonary vascularity is normal. No visible pneumothorax or pleural effusion.     Impression: Shallow lung expansion with suspected right infrahilar infiltrate. Signer Name: Prabhjot Celeste MD  Signed: 1/16/2020 6:50 PM  Workstation [facility]: [facility]  [facility]    Us Venous Doppler Upper Extremity Right (duplex)    Result Date: 1/17/2020  Narrative: EXAM: VENOUS DOPPLER ULTRASOUND, RIGHT UPPER EXTREMITY, 01/17/2019  HISTORY: Right hand edema  TECHNIQUE: Venous Doppler ultrasound examination of the right upper extremity was performed using grey-scale, spectral Doppler, and color flow Doppler ultrasound imaging.  FINDINGS: The examination is negative.  There is no evidence of deep venous thrombosis in the internal jugular vein, subclavian vein, axillary vein or brachial veins.  There is no visible superficial venous thrombosis within the cephalic or basilic veins.      Impression: Negative examination.  No evidence of right upper extremity venous thrombosis.  This report was finalized on 1/17/2020 12:30 PM by Dr. Porter Lopez MD.      Xr Abdomen Kub    Result Date: 1/19/2020  Narrative: CR Abdomen 1 Vw INDICATION: Evaluate Dobbhoff tube position COMPARISON: None available FINDINGS: AP radiographs of the abdomen. The renal shadows are symmetric. No renal calculi. No bladder calculi. The  bowel gas pattern is nonobstructive. No acute osseous abnormalities. Dobbhoff tube has its tip in the stomach. There is a tube superimposed on the bladder probably representing a catheter     Impression: Dobbhoff tube tip is in the upper stomach. There is a tube superimposed on the bladder suggesting a catheter Signer Name: Porter Lopez MD  Signed: 1/19/2020 12:31 PM  Workstation Name: [facility]  [facility]      Condition on Discharge:  At his new baseline    Discharge Disposition  To [facility] for psychiatry      Discharge Diet:      Dietary Orders (From admission, onward)     Start     Ordered    01/22/20 0735  Diet Soft Texture; [employer]; Safe Tray  Diet Effective Now     Question Answer Comment   Diet Texture / Consistency Soft Texture    Select Texture: [employer]    Other Modifiers: Safe Tray        01/22/20 0737                Activity at Discharge:  activity as tolerated      Pre-discharge education      Follow-up Appointments  No future appointments.      Test Results Pending at Discharge   Order Current Status    Respiratory Culture - Sputum, Throat Preliminary result           Ambrose Richards MD  01/26/20  9:52 AM    Time: Discharge > 30 min (if over 30 minutes give explanation as to why it took greater than 30 minutes) coordination of care with case management, review of records, discussion with patient at bedside, medication reconciliation      Electronically signed by Ambrose Richards MD at 01/26/20 8988